# Patient Record
Sex: FEMALE | Race: WHITE | NOT HISPANIC OR LATINO | Employment: OTHER | URBAN - METROPOLITAN AREA
[De-identification: names, ages, dates, MRNs, and addresses within clinical notes are randomized per-mention and may not be internally consistent; named-entity substitution may affect disease eponyms.]

---

## 2018-03-16 ENCOUNTER — OFFICE VISIT (OUTPATIENT)
Dept: URGENT CARE | Facility: CLINIC | Age: 78
End: 2018-03-16
Payer: MEDICARE

## 2018-03-16 VITALS
HEIGHT: 63 IN | HEART RATE: 70 BPM | BODY MASS INDEX: 27.29 KG/M2 | WEIGHT: 154 LBS | RESPIRATION RATE: 18 BRPM | SYSTOLIC BLOOD PRESSURE: 128 MMHG | OXYGEN SATURATION: 96 % | DIASTOLIC BLOOD PRESSURE: 76 MMHG | TEMPERATURE: 98.3 F

## 2018-03-16 DIAGNOSIS — L23.5 ALLERGIC DERMATITIS DUE TO OTHER CHEMICAL PRODUCT: Primary | ICD-10-CM

## 2018-03-16 PROCEDURE — 99213 OFFICE O/P EST LOW 20 MIN: CPT | Performed by: NURSE PRACTITIONER

## 2018-03-16 PROCEDURE — G0463 HOSPITAL OUTPT CLINIC VISIT: HCPCS | Performed by: NURSE PRACTITIONER

## 2018-03-16 RX ORDER — APIXABAN 5 MG/1
TABLET, FILM COATED ORAL
COMMUNITY
Start: 2018-03-06

## 2018-03-16 RX ORDER — LEVOTHYROXINE SODIUM 0.05 MG/1
TABLET ORAL
COMMUNITY
Start: 2018-01-10

## 2018-03-16 RX ORDER — ATENOLOL 25 MG/1
TABLET ORAL
COMMUNITY
Start: 2018-03-12

## 2018-03-16 RX ORDER — FLUTICASONE PROPIONATE 50 MCG
SPRAY, SUSPENSION (ML) NASAL
COMMUNITY
Start: 2017-12-14

## 2018-03-16 RX ORDER — BRIMONIDINE TARTRATE/TIMOLOL 0.2%-0.5%
DROPS OPHTHALMIC (EYE)
COMMUNITY
Start: 2018-02-12

## 2018-03-16 RX ORDER — SIMVASTATIN 10 MG
TABLET ORAL
COMMUNITY
Start: 2018-01-10

## 2018-03-16 RX ORDER — LEVOCETIRIZINE DIHYDROCHLORIDE 5 MG/1
TABLET, FILM COATED ORAL
COMMUNITY
Start: 2018-03-08

## 2018-03-16 RX ORDER — OMEPRAZOLE 40 MG/1
CAPSULE, DELAYED RELEASE ORAL
COMMUNITY
Start: 2018-03-06

## 2018-03-16 RX ORDER — TRIAMCINOLONE ACETONIDE 1 MG/G
CREAM TOPICAL 2 TIMES DAILY
Qty: 30 G | Refills: 0 | Status: SHIPPED | OUTPATIENT
Start: 2018-03-16

## 2018-03-16 RX ORDER — METHYLPREDNISOLONE 4 MG/1
TABLET ORAL
Qty: 21 TABLET | Refills: 0 | Status: SHIPPED | OUTPATIENT
Start: 2018-03-16 | End: 2021-06-22 | Stop reason: ALTCHOICE

## 2018-03-16 NOTE — PROGRESS NOTES
[facility]        NAME: Nadiya Ortega is a 69 y o  female  : 1940    MRN: [id number]  DATE: 2018  TIME: 11:06 AM    Assessment and Plan   Allergic dermatitis due to other chemical product [L23 5]  1  Allergic dermatitis due to other chemical product  Methylprednisolone 4 MG TBPK    triamcinolone (KENALOG) 0 1 % cream         Patient Instructions       Follow up with PCP in 3-5 days  Proceed to  ER if symptoms worsen  You appear to be having an allergic reaction to the facial cream you have been using  Stop using the cream     You have been prescribed a facial and oral steroid - take as prescribed  See a dermatologist if this does not help with symptoms  Cool compresses for swelling and pain  Chief Complaint     Chief Complaint   Patient presents with    Rash     Three days ago she developed red, swollen itchy patches on her cheeks and below her eyes  History of Present Illness       This is a 69year old female who states started using a new facial cream from [place] and then 1 week later developed bilateral eye swelling, facial warmth and redness from the cream  She denies any ACE inhibitor use, new perfumes, soaps, detergents  Rash   This is a new problem  The current episode started in the past 7 days  Review of Systems   Review of Systems   Constitutional: Negative  HENT: Positive for facial swelling  Eyes: Negative  Respiratory: Negative  Cardiovascular: Negative  Gastrointestinal: Negative  Endocrine: Negative  Genitourinary: Negative  Musculoskeletal: Negative  Skin: Positive for rash  Allergic/Immunologic: Negative  Neurological: Negative  Hematological: Negative  Psychiatric/Behavioral: Negative            Current Medications       Current Outpatient Prescriptions:     atenolol (TENORMIN) 25 mg tablet, , Disp: , Rfl:     COMBIGAN 0 2-0 5 %, , Disp: , Rfl:     ELIQUIS 5 MG, , Disp: , Rfl:    fluticasone (FLONASE) 50 mcg/act nasal spray, , Disp: , Rfl:     levocetirizine (XYZAL) 5 MG tablet, , Disp: , Rfl:     levothyroxine 50 mcg tablet, , Disp: , Rfl:     Multiple Vitamins-Minerals (MULTIVITAMIN ADULT PO), Take by mouth, Disp: , Rfl:     omeprazole (PriLOSEC) 40 MG capsule, , Disp: , Rfl:     simvastatin (ZOCOR) 10 mg tablet, , Disp: , Rfl:     Methylprednisolone 4 MG TBPK, Use as directed on package, Disp: 21 tablet, Rfl: 0    triamcinolone (KENALOG) 0 1 % cream, Apply topically 2 (two) times a day Apply a very small thin layer  Do not get in the eyes  , Disp: 30 g, Rfl: 0    Current Allergies     Allergies as of 03/16/2018    (No Known Allergies)            The following portions of the patient's history were reviewed and updated as appropriate: allergies, current medications, past family history, past medical history, past social history, past surgical history and problem list      No past medical history on file  No past surgical history on file  No family history on file  Medications have been verified  Objective   /76   Pulse 70   Temp 98 3 °F (36 8 °C)   Resp 18   Ht 5' 3" (1 6 m)   Wt 69 9 kg (154 lb)   SpO2 96%   BMI 27 28 kg/m²        Physical Exam     Physical Exam   Constitutional: She is oriented to person, place, and time  She appears well-developed and well-nourished  No distress  HENT:   B/L upper and lower eye lids are swollen, red and warm to touch  This extends into the right cheek  Left cheek is also warm, red and swollen with extension to mandible area  Or oral airway edema      Eyes: EOM are normal  Pupils are equal, round, and reactive to light  Neck: Normal range of motion  Neck supple  Cardiovascular: Normal rate, regular rhythm and normal heart sounds  Pulmonary/Chest: Effort normal and breath sounds normal    Abdominal: Soft  Bowel sounds are normal    Musculoskeletal: Normal range of motion     Neurological: She is alert and oriented to person, place, and time  She has normal reflexes  Skin: Skin is warm and dry  She is not diaphoretic  Psychiatric: She has a normal mood and affect  Her behavior is normal  Judgment and thought content normal    Nursing note and vitals reviewed

## 2018-03-16 NOTE — PATIENT INSTRUCTIONS
Follow up with PCP in 3-5 days  Proceed to  ER if symptoms worsen  You appear to be having an allergic reaction to the facial cream you have been using  Stop using the cream     You have been prescribed a facial and oral steroid - take as prescribed  See a dermatologist if this does not help with symptoms  Cool compresses for swelling and pain  Contact Dermatitis   WHAT YOU NEED TO KNOW:   Contact dermatitis is a skin rash  It develops when you touch something that irritates your skin or causes an allergic reaction  DISCHARGE INSTRUCTIONS:   Call 911 for any of the following:   · You have sudden trouble breathing  · Your throat swells and you have trouble eating  · Your face is swollen  Contact your healthcare provider if:   · You have a fever  · Your blisters are draining pus  · Your rash spreads or does not get better, even after treatment  · You have questions or concerns about your condition or care  Medicines:   · Medicines  help decrease itching and swelling  They will be given as a topical medicine to apply to your rash or as a pill  · Take your medicine as directed  Contact your healthcare provider if you think your medicine is not helping or if you have side effects  Tell him or her if you are allergic to any medicine  Keep a list of the medicines, vitamins, and herbs you take  Include the amounts, and when and why you take them  Bring the list or the pill bottles to follow-up visits  Carry your medicine list with you in case of an emergency  Manage contact dermatitis:   · Take short baths or showers in cool water  Use mild soap or soap-free cleansers  Add oatmeal, baking soda, or cornstarch to the bath water to help decrease skin irritation  · Avoid skin irritants , such as makeup, hair products, soaps, and cleansers  Use products that do not contain perfume or dye  · Apply a cool compress to your rash  This will help soothe your skin       · Keep your skin moist   Rub unscented cream or lotion on your skin to prevent dryness and itching  Do this right after a bath or shower when your skin is still damp  Follow up with your healthcare provider or dermatologist in 2 to 3 days:  Write down your questions so you remember to ask them during your visits  © 2017 [employer] Information is for End User's use only and may not be sold, redistributed or otherwise used for commercial purposes  All illustrations and images included in CareNotes® are the copyrighted property of [employer]  or Sergio Owen  The above information is an  only  It is not intended as medical advice for individual conditions or treatments  Talk to your doctor, nurse or pharmacist before following any medical regimen to see if it is safe and effective for you

## 2019-12-14 ENCOUNTER — OFFICE VISIT (OUTPATIENT)
Dept: URGENT CARE | Facility: CLINIC | Age: 79
End: 2019-12-14
Payer: MEDICARE

## 2019-12-14 VITALS
RESPIRATION RATE: 16 BRPM | BODY MASS INDEX: 27.46 KG/M2 | TEMPERATURE: 99.1 F | HEART RATE: 70 BPM | DIASTOLIC BLOOD PRESSURE: 62 MMHG | SYSTOLIC BLOOD PRESSURE: 118 MMHG | WEIGHT: 155 LBS | OXYGEN SATURATION: 94 % | HEIGHT: 63 IN

## 2019-12-14 DIAGNOSIS — J06.9 UPPER RESPIRATORY TRACT INFECTION, UNSPECIFIED TYPE: Primary | ICD-10-CM

## 2019-12-14 DIAGNOSIS — H10.022 PINK EYE DISEASE OF LEFT EYE: ICD-10-CM

## 2019-12-14 PROCEDURE — 99213 OFFICE O/P EST LOW 20 MIN: CPT | Performed by: PREVENTIVE MEDICINE

## 2019-12-14 PROCEDURE — G0463 HOSPITAL OUTPT CLINIC VISIT: HCPCS | Performed by: PREVENTIVE MEDICINE

## 2019-12-14 RX ORDER — GENTAMICIN SULFATE 3 MG/ML
1 SOLUTION/ DROPS OPHTHALMIC EVERY 4 HOURS
Qty: 5 ML | Refills: 0 | Status: SHIPPED | OUTPATIENT
Start: 2019-12-14

## 2019-12-14 NOTE — PATIENT INSTRUCTIONS
Use the eyedrops as directed  If the redness is not gone in the eye I Monday you must have a recheck  Drink at least 6 or 8 glasses of water or juice a day  Using a vaporizer or humidifier in the bedroom will be very helpful  Motrin or Aleve or aspirin for fever chills or aches  Robitussin DM or NyQuil for cough  Afrin nasal spray for facial and head congestion  Inhaling steam coming up from the sink will be very helpful  If symptoms are getting worse over the next 4-7 days you must be rechecked

## 2019-12-14 NOTE — PROGRESS NOTES
[facility]        NAME: Silvia Barnett is a 67 y o  female  : 1940    MRN: [id number]  DATE: 2019  TIME: 11:10 AM    Assessment and Plan   Upper respiratory tract infection, unspecified type [J06 9]  1  Upper respiratory tract infection, unspecified type     2  Pink eye disease of left eye  gentamicin (GARAMYCIN) 0 3 % ophthalmic solution         Patient Instructions       Follow up with PCP in 3-5 days  Proceed to  ER if symptoms worsen  Chief Complaint     Chief Complaint   Patient presents with    Cold Like Symptoms     Sinus congestion, pressure and eye reddness since Friday    Eye Pain         History of Present Illness       Cough congestion and eye redness beginning yesterday  Review of Systems   Review of Systems   HENT: Positive for congestion  Eyes: Positive for discharge and redness  Respiratory: Positive for cough  Current Medications       Current Outpatient Medications:     atenolol (TENORMIN) 25 mg tablet, , Disp: , Rfl:     COMBIGAN 0 2-0 5 %, , Disp: , Rfl:     ELIQUIS 5 MG, , Disp: , Rfl:     levothyroxine 50 mcg tablet, , Disp: , Rfl:     Multiple Vitamins-Minerals (MULTIVITAMIN ADULT PO), Take by mouth, Disp: , Rfl:     simvastatin (ZOCOR) 10 mg tablet, , Disp: , Rfl:     fluticasone (FLONASE) 50 mcg/act nasal spray, , Disp: , Rfl:     gentamicin (GARAMYCIN) 0 3 % ophthalmic solution, Administer 1 drop into the left eye every 4 (four) hours, Disp: 5 mL, Rfl: 0    levocetirizine (XYZAL) 5 MG tablet, , Disp: , Rfl:     Methylprednisolone 4 MG TBPK, Use as directed on package (Patient not taking: Reported on 2019), Disp: 21 tablet, Rfl: 0    omeprazole (PriLOSEC) 40 MG capsule, , Disp: , Rfl:     triamcinolone (KENALOG) 0 1 % cream, Apply topically 2 (two) times a day Apply a very small thin layer  Do not get in the eyes   (Patient not taking: Reported on 2019), Disp: 30 g, Rfl: 0    Current Allergies     Allergies as of 12/14/2019    (No Known Allergies)            The following portions of the patient's history were reviewed and updated as appropriate: allergies, current medications, past family history, past medical history, past social history, past surgical history and problem list      No past medical history on file  No past surgical history on file  No family history on file  Medications have been verified  Objective   /62   Pulse 70   Temp 99 1 °F (37 3 °C)   Resp 16   Ht 5' 3" (1 6 m)   Wt 70 3 kg (155 lb)   SpO2 94%   BMI 27 46 kg/m²        Physical Exam     Physical Exam   HENT:   Right Ear: External ear normal    Left Ear: External ear normal    Mouth/Throat: Oropharynx is clear and moist    Eyes:   The left eye has a very markedly reddened and injected sclera with some discharge on the lids   Neck: Normal range of motion  Cardiovascular: Normal heart sounds  Pulmonary/Chest: Breath sounds normal    Lymphadenopathy:     She has no cervical adenopathy

## 2020-05-19 LAB — HBA1C MFR BLD HPLC: 5.6 %

## 2021-01-18 ENCOUNTER — TELEMEDICINE (OUTPATIENT)
Dept: GASTROENTEROLOGY | Facility: CLINIC | Age: 81
End: 2021-01-18
Payer: MEDICARE

## 2021-01-18 VITALS — BODY MASS INDEX: 26.58 KG/M2 | HEIGHT: 63 IN | WEIGHT: 150 LBS

## 2021-01-18 DIAGNOSIS — Z85.038 HISTORY OF COLON CANCER: Primary | ICD-10-CM

## 2021-01-18 DIAGNOSIS — R05.3 CHRONIC COUGH: ICD-10-CM

## 2021-01-18 DIAGNOSIS — Z79.01 CHRONIC ANTICOAGULATION: ICD-10-CM

## 2021-01-18 PROCEDURE — 99442 PR PHYS/QHP TELEPHONE EVALUATION 11-20 MIN: CPT | Performed by: INTERNAL MEDICINE

## 2021-01-18 NOTE — LETTER
January 18, 2021     [facility]  [facility]  [address]    Patient: Renee Velazco   YOB: 1940   Date of Visit: 1/18/2021       Dear Dr Sarita Flores: Thank you for referring Alpa Orourke to me for evaluation  Below are my notes for this consultation  If you have questions, please do not hesitate to call me  I look forward to following your patient along with you  Sincerely,        Lei Like Karyle Aland, MD        CC: Kit Ruse, MD Lei Like Karyle Aland, MD  1/18/2021  3:56 PM  Sign when Signing Visit    Virtual Brief Visit  It was my intent to perform this visit via video technology but the patient was not able to do a video connection so the visit was completed via audio telephone only  Assessment/Plan:  1  Personal history of colon cancer  S/P sigmoid resection 2011  Last colonoscopy December 2017  - colonoscopy at [facility]    2  Atrial fibrillation on chronic anticoagulation  Currently on Eliquis  Followed by Dr Jamarcus Vasquez  - will hold Eliquis for 2 days if okay with Cardiology  Patient understands the potential thromboembolic risks    3  Chronic cough  Thought possibly related to GERD but no improvement with omeprazole  ENT related? - Stop omeprazole  - T/C ENT evaluation  Problem List Items Addressed This Visit        Other    Chronic anticoagulation    History of colon cancer - Primary    Chronic cough                Reason for visit is   Chief Complaint   Patient presents with    Colonoscopy     On Eliquis    Virtual Brief Visit        Encounter provider Lei Like Karyle Aland, MD    Provider located at [address] [address] [phone]    Recent Visits  No visits were found meeting these conditions     Showing recent visits within past 7 days and meeting all other requirements     Today's Visits  Date Type Provider Dept 01/18/21 Telemedicine Arnold Severe, MD [facility]   Showing today's visits and meeting all other requirements     Future Appointments  No visits were found meeting these conditions  Showing future appointments within next 150 days and meeting all other requirements        After connecting through telephone, the patient was identified by name and date of birth  Luisana Oquendo was informed that this is a telemedicine visit and that the visit is being conducted through telephone  My office door was closed  No one else was in the room  She acknowledged consent and understanding of privacy and security of the platform  The patient has agreed to participate and understands she can discontinue the visit at any time  Patient is aware this is a billable service  Subjective    Luisana Oquendo is a 79 y o  female with a history of colon cancer who had a sigmoid resection 2011  Her last colonoscopy was 2017  She is due for a follow exam but she is on anticoagulation because of atrial fibrillation  Patient had office visit scheduled to manage anticoagulation  From a GI standpoint she is doing well  She is moving her bowels regularly  She denies any GI bleeding, abdominal pain, or weight loss  Her cardiac status is stable as well  She continues to have chronic cough  Omeprazole was started in 2017 for this chronic cough thinking it may be atypical GERD  Unfortunately despite taking this is made no difference in her symptoms  She denies any dysphagia, odynophagia, early satiety  She denies any heartburn or indigestion        HPI     Past Medical History:   Diagnosis Date    Atrial fibrillation ([facility] [address] )     Dr Jackeline Covington Chronic anticoagulation     History of colon cancer s/p sigmoid resection 2011    Hyperlipidemia     Hypertension        Past Surgical History:   Procedure Laterality Date    ATRIAL CARDIAC PACEMAKER INSERTION  2006 and 2014    COLON SIGMOID RESECTION  2011 Current Outpatient Medications   Medication Sig Dispense Refill    atenolol (TENORMIN) 25 mg tablet       COMBIGAN 0 2-0 5 %       ELIQUIS 5 MG       gentamicin (GARAMYCIN) 0 3 % ophthalmic solution Administer 1 drop into the left eye every 4 (four) hours 5 mL 0    levothyroxine 50 mcg tablet       Multiple Vitamins-Minerals (MULTIVITAMIN ADULT PO) Take by mouth      omeprazole (PriLOSEC) 40 MG capsule       simvastatin (ZOCOR) 10 mg tablet       fluticasone (FLONASE) 50 mcg/act nasal spray       levocetirizine (XYZAL) 5 MG tablet       Methylprednisolone 4 MG TBPK Use as directed on package (Patient not taking: Reported on 1/18/2021) 21 tablet 0    triamcinolone (KENALOG) 0 1 % cream Apply topically 2 (two) times a day Apply a very small thin layer  Do not get in the eyes  (Patient not taking: Reported on 1/18/2021) 30 g 0     No current facility-administered medications for this visit  Social History     Tobacco Use    Smoking status: Never Smoker    Smokeless tobacco: Never Used   Substance Use Topics    Alcohol use: Yes     Frequency: Monthly or less    Drug use: Not on file     Family History   Problem Relation Age of Onset    Colon cancer Brother        No Known Allergies    Review of Systems per HPI otherwise negative    Vitals:    01/18/21 0913   Weight: 68 kg (150 lb)   Height: 5' 3" (1 6 m)         I spent 20 minutes directly with the patient during this visit    Trice Wallace Byron acknowledges that she has consented to an online visit or consultation  She understands that the online visit is based solely on information provided by her, and that, in the absence of a face-to-face physical evaluation by the physician, the diagnosis she receives is both limited and provisional in terms of accuracy and completeness  This is not intended to replace a full medical face-to-face evaluation by the physician   Pro Lange understands and accepts these terms

## 2021-01-18 NOTE — H&P (VIEW-ONLY)
Virtual Brief Visit  It was my intent to perform this visit via video technology but the patient was not able to do a video connection so the visit was completed via audio telephone only  Assessment/Plan:  1  Personal history of colon cancer  S/P sigmoid resection 2011  Last colonoscopy December 2017  - colonoscopy at [facility]    2  Atrial fibrillation on chronic anticoagulation  Currently on Eliquis  Followed by Dr Carl Tafoya  - will hold Eliquis for 2 days if okay with Cardiology  Patient understands the potential thromboembolic risks    3  Chronic cough  Thought possibly related to GERD but no improvement with omeprazole  ENT related? - Stop omeprazole  - T/C ENT evaluation  Problem List Items Addressed This Visit        Other    Chronic anticoagulation    History of colon cancer - Primary    Chronic cough                Reason for visit is   Chief Complaint   Patient presents with    Colonoscopy     On Eliquis    Virtual Brief Visit        Encounter provider Jalen Rhoades MD    Provider located at [address] [address] [phone]    Recent Visits  No visits were found meeting these conditions  Showing recent visits within past 7 days and meeting all other requirements     Today's Visits  Date Type Provider Dept   01/18/21 Telemedicine Maria E Linton MD [facility]   Showing today's visits and meeting all other requirements     Future Appointments  No visits were found meeting these conditions  Showing future appointments within next 150 days and meeting all other requirements        After connecting through telephone, the patient was identified by name and date of birth  Vy Marie was informed that this is a telemedicine visit and that the visit is being conducted through telephone  My office door was closed  No one else was in the room    She acknowledged consent and understanding of privacy and security of the platform  The patient has agreed to participate and understands she can discontinue the visit at any time  Patient is aware this is a billable service  Subjective    Lesa Palma is a 79 y o  female with a history of colon cancer who had a sigmoid resection 2011  Her last colonoscopy was 2017  She is due for a follow exam but she is on anticoagulation because of atrial fibrillation  Patient had office visit scheduled to manage anticoagulation  From a GI standpoint she is doing well  She is moving her bowels regularly  She denies any GI bleeding, abdominal pain, or weight loss  Her cardiac status is stable as well  She continues to have chronic cough  Omeprazole was started in 2017 for this chronic cough thinking it may be atypical GERD  Unfortunately despite taking this is made no difference in her symptoms  She denies any dysphagia, odynophagia, early satiety  She denies any heartburn or indigestion        HPI     Past Medical History:   Diagnosis Date    Atrial fibrillation ([facility] [address] )     Dr Gamble Stage Chronic anticoagulation     History of colon cancer s/p sigmoid resection 2011    Hyperlipidemia     Hypertension        Past Surgical History:   Procedure Laterality Date    ATRIAL CARDIAC PACEMAKER INSERTION  2006 and 2014    COLON SIGMOID RESECTION  2011       Current Outpatient Medications   Medication Sig Dispense Refill    atenolol (TENORMIN) 25 mg tablet       COMBIGAN 0 2-0 5 %       ELIQUIS 5 MG       gentamicin (GARAMYCIN) 0 3 % ophthalmic solution Administer 1 drop into the left eye every 4 (four) hours 5 mL 0    levothyroxine 50 mcg tablet       Multiple Vitamins-Minerals (MULTIVITAMIN ADULT PO) Take by mouth      omeprazole (PriLOSEC) 40 MG capsule       simvastatin (ZOCOR) 10 mg tablet       fluticasone (FLONASE) 50 mcg/act nasal spray       levocetirizine (XYZAL) 5 MG tablet       Methylprednisolone 4 MG TBPK Use as directed on package (Patient not taking: Reported on 1/18/2021) 21 tablet 0    triamcinolone (KENALOG) 0 1 % cream Apply topically 2 (two) times a day Apply a very small thin layer  Do not get in the eyes  (Patient not taking: Reported on 1/18/2021) 30 g 0     No current facility-administered medications for this visit  Social History     Tobacco Use    Smoking status: Never Smoker    Smokeless tobacco: Never Used   Substance Use Topics    Alcohol use: Yes     Frequency: Monthly or less    Drug use: Not on file     Family History   Problem Relation Age of Onset    Colon cancer Brother        No Known Allergies    Review of Systems per HPI otherwise negative    Vitals:    01/18/21 0913   Weight: 68 kg (150 lb)   Height: 5' 3" (1 6 m)         I spent 20 minutes directly with the patient during this visit    Trice Walalce Byron acknowledges that she has consented to an online visit or consultation  She understands that the online visit is based solely on information provided by her, and that, in the absence of a face-to-face physical evaluation by the physician, the diagnosis she receives is both limited and provisional in terms of accuracy and completeness  This is not intended to replace a full medical face-to-face evaluation by the physician  Evert Hogan understands and accepts these terms

## 2021-01-18 NOTE — PROGRESS NOTES
Virtual Brief Visit  It was my intent to perform this visit via video technology but the patient was not able to do a video connection so the visit was completed via audio telephone only  Assessment/Plan:  1  Personal history of colon cancer  S/P sigmoid resection 2011  Last colonoscopy December 2017  - colonoscopy at [facility]    2  Atrial fibrillation on chronic anticoagulation  Currently on Eliquis  Followed by Dr Ana Cassidy  - will hold Eliquis for 2 days if okay with Cardiology  Patient understands the potential thromboembolic risks    3  Chronic cough  Thought possibly related to GERD but no improvement with omeprazole  ENT related? - Stop omeprazole  - T/C ENT evaluation  Problem List Items Addressed This Visit        Other    Chronic anticoagulation    History of colon cancer - Primary    Chronic cough                Reason for visit is   Chief Complaint   Patient presents with    Colonoscopy     On Eliquis    Virtual Brief Visit        Encounter provider Greyson Blankenship MD    Provider located at [address] [address] [phone]    Recent Visits  No visits were found meeting these conditions  Showing recent visits within past 7 days and meeting all other requirements     Today's Visits  Date Type Provider Dept   01/18/21 Telemedicine Chelo Estes MD [facility]   Showing today's visits and meeting all other requirements     Future Appointments  No visits were found meeting these conditions  Showing future appointments within next 150 days and meeting all other requirements        After connecting through telephone, the patient was identified by name and date of birth  Christina Patel was informed that this is a telemedicine visit and that the visit is being conducted through telephone  My office door was closed  No one else was in the room    She acknowledged consent and understanding of privacy and security of the platform  The patient has agreed to participate and understands she can discontinue the visit at any time  Patient is aware this is a billable service  Subjective    [place] Doctor is a 79 y o  female with a history of colon cancer who had a sigmoid resection 2011  Her last colonoscopy was 2017  She is due for a follow exam but she is on anticoagulation because of atrial fibrillation  Patient had office visit scheduled to manage anticoagulation  From a GI standpoint she is doing well  She is moving her bowels regularly  She denies any GI bleeding, abdominal pain, or weight loss  Her cardiac status is stable as well  She continues to have chronic cough  Omeprazole was started in 2017 for this chronic cough thinking it may be atypical GERD  Unfortunately despite taking this is made no difference in her symptoms  She denies any dysphagia, odynophagia, early satiety  She denies any heartburn or indigestion        HPI     Past Medical History:   Diagnosis Date    Atrial fibrillation ([facility] [address] )     Dr Nieves Bronson Chronic anticoagulation     History of colon cancer s/p sigmoid resection 2011    Hyperlipidemia     Hypertension        Past Surgical History:   Procedure Laterality Date    ATRIAL CARDIAC PACEMAKER INSERTION  2006 and 2014    COLON SIGMOID RESECTION  2011       Current Outpatient Medications   Medication Sig Dispense Refill    atenolol (TENORMIN) 25 mg tablet       COMBIGAN 0 2-0 5 %       ELIQUIS 5 MG       gentamicin (GARAMYCIN) 0 3 % ophthalmic solution Administer 1 drop into the left eye every 4 (four) hours 5 mL 0    levothyroxine 50 mcg tablet       Multiple Vitamins-Minerals (MULTIVITAMIN ADULT PO) Take by mouth      omeprazole (PriLOSEC) 40 MG capsule       simvastatin (ZOCOR) 10 mg tablet       fluticasone (FLONASE) 50 mcg/act nasal spray       levocetirizine (XYZAL) 5 MG tablet       Methylprednisolone 4 MG TBPK Use as directed on package (Patient not taking: Reported on 1/18/2021) 21 tablet 0    triamcinolone (KENALOG) 0 1 % cream Apply topically 2 (two) times a day Apply a very small thin layer  Do not get in the eyes  (Patient not taking: Reported on 1/18/2021) 30 g 0     No current facility-administered medications for this visit  Social History     Tobacco Use    Smoking status: Never Smoker    Smokeless tobacco: Never Used   Substance Use Topics    Alcohol use: Yes     Frequency: Monthly or less    Drug use: Not on file     Family History   Problem Relation Age of Onset    Colon cancer Brother        No Known Allergies    Review of Systems per HPI otherwise negative    Vitals:    01/18/21 0913   Weight: 68 kg (150 lb)   Height: 5' 3" (1 6 m)         I spent 20 minutes directly with the patient during this visit    Trice Wallcae Byron acknowledges that she has consented to an online visit or consultation  She understands that the online visit is based solely on information provided by her, and that, in the absence of a face-to-face physical evaluation by the physician, the diagnosis she receives is both limited and provisional in terms of accuracy and completeness  This is not intended to replace a full medical face-to-face evaluation by the physician  Graciela Marte understands and accepts these terms

## 2021-01-19 ENCOUNTER — TELEPHONE (OUTPATIENT)
Dept: GASTROENTEROLOGY | Facility: CLINIC | Age: 81
End: 2021-01-19

## 2021-01-28 ENCOUNTER — TELEMEDICINE (OUTPATIENT)
Dept: GASTROENTEROLOGY | Facility: CLINIC | Age: 81
End: 2021-01-28

## 2021-01-28 VITALS — HEIGHT: 63 IN | BODY MASS INDEX: 26.58 KG/M2 | WEIGHT: 150 LBS

## 2021-01-28 DIAGNOSIS — Z85.038 HISTORY OF COLON CANCER: Primary | ICD-10-CM

## 2021-01-28 NOTE — PROGRESS NOTES
Why does your doctor want you to have this procedure? phx colon ca    Do you have kidney disease?  no  If yes, are you on dialysis :     Have you had diverticulitis within the past 2 months? no    Are you diabetic?  no  If yes, insulin dependent: If yes, provide diabetic instructions sheet     Do take iron supplements?  no  If yes, instruct patient to hold iron supplement for 7 days prior    Are you on a blood thinner? yes   Was the blood thinner sheet complete and faxed to cardiologist yes  Plavix (clopidogrel), Coumadin (warfarin), Lovenox (enoxaparin), Xarelto (rivaroxaban), Pradaxa(dabigatran), Eliquis(apixaban) Savaysa/Lixiana (edoxapan)    Do you have an automatic implantable cardiac defibrillator (AICD)/pacemaker ([facility])? , Pacemaker only  Was AICD/pacemaker sheet completed and faxed to cardiologist? no    Are you on home oxygen? no  If yes, continuous or nocturnal:     Have you been treated for MRSA, VRE or any communicable diseases? no    Heart attack, stroke, or stent within 3 months? no  Schedule at Hospital if within 3-6 months   Use nitroglycerin for chest pain in the last 6 months? no    History of organ  transplant?  no   If yes, notify Endo      History of neck/throat/tongue surgery or cancer? no  IF yes, notify Endo      Any problems with anesthesia in the past? no     Was stool C diff ordered?  no Stool specimen needs to be completed prior to procedure    Do have any facial or body piercings?no     Do you have a latex allergy? no     Do have an allergy to metals? ([facility] study only) no     If pediatric patient, was consent faxed to pediatrician no     Patient rights reviewed yes     Colon phone prep completed; suprep instructions reviewed and emailed to pt; rx forwarded to provider for approval; pt will hold eliquis 2 days prior; last dose 2/1

## 2021-02-04 ENCOUNTER — ANESTHESIA EVENT (OUTPATIENT)
Dept: GASTROENTEROLOGY | Facility: AMBULATORY SURGERY CENTER | Age: 81
End: 2021-02-04

## 2021-02-04 ENCOUNTER — ANESTHESIA (OUTPATIENT)
Dept: GASTROENTEROLOGY | Facility: AMBULATORY SURGERY CENTER | Age: 81
End: 2021-02-04

## 2021-02-04 ENCOUNTER — HOSPITAL ENCOUNTER (OUTPATIENT)
Dept: GASTROENTEROLOGY | Facility: AMBULATORY SURGERY CENTER | Age: 81
Discharge: HOME | End: 2021-02-04
Payer: MEDICARE

## 2021-02-04 VITALS — HEART RATE: 69 BPM

## 2021-02-04 VITALS
OXYGEN SATURATION: 99 % | HEART RATE: 69 BPM | RESPIRATION RATE: 33 BRPM | TEMPERATURE: 98.4 F | DIASTOLIC BLOOD PRESSURE: 54 MMHG | SYSTOLIC BLOOD PRESSURE: 105 MMHG

## 2021-02-04 DIAGNOSIS — Z85.038 HISTORY OF COLON CANCER: ICD-10-CM

## 2021-02-04 PROCEDURE — 88305 TISSUE EXAM BY PATHOLOGIST: CPT | Performed by: PATHOLOGY

## 2021-02-04 PROCEDURE — 45380 COLONOSCOPY AND BIOPSY: CPT | Performed by: INTERNAL MEDICINE

## 2021-02-04 RX ORDER — PROPOFOL 10 MG/ML
INJECTION, EMULSION INTRAVENOUS AS NEEDED
Status: DISCONTINUED | OUTPATIENT
Start: 2021-02-04 | End: 2021-02-04

## 2021-02-04 RX ORDER — SODIUM CHLORIDE 9 MG/ML
50 INJECTION, SOLUTION INTRAVENOUS CONTINUOUS
Status: DISCONTINUED | OUTPATIENT
Start: 2021-02-04 | End: 2021-02-08 | Stop reason: HOSPADM

## 2021-02-04 RX ADMIN — SODIUM CHLORIDE: 9 INJECTION, SOLUTION INTRAVENOUS at 08:24

## 2021-02-04 RX ADMIN — PROPOFOL 100 MG: 10 INJECTION, EMULSION INTRAVENOUS at 08:30

## 2021-02-04 RX ADMIN — PROPOFOL 100 MG: 10 INJECTION, EMULSION INTRAVENOUS at 08:42

## 2021-02-04 NOTE — INTERVAL H&P NOTE
H&P reviewed  After examining the patient I find no changes in the patients condition since the H&P had been written      Vitals:    02/04/21 0806   BP: 111/70   Pulse: 72   Resp: 12   Temp: 98 4 °F (36 9 °C)   SpO2: 96%

## 2021-02-04 NOTE — DISCHARGE INSTRUCTIONS
Hemorrhoids   WHAT YOU NEED TO KNOW:   What are hemorrhoids? Hemorrhoids are swollen blood vessels inside your rectum (internal hemorrhoids) or on your anus (external hemorrhoids)  Sometimes a hemorrhoid may prolapse  This means it extends out of your anus  What increases my risk for hemorrhoids? · Pregnancy or obesity    · Straining or sitting for a long time during bowel movements    · Liver disease    · Weak muscles around the anus caused by older age, rectal surgery, or anal intercourse    · A lack of physical activity    · Chronic diarrhea or constipation    · A low-fiber diet    What are the signs and symptoms of hemorrhoids? · Pain or itching around your anus or inside your rectum    · Swelling or bumps around your anus    · Bright red blood in your bowel movement, on the toilet paper, or in the toilet bowl    · Tissue bulging out of your anus (prolapsed hemorrhoids)    · Incontinence (poor control over urine or bowel movements)    How are hemorrhoids diagnosed? Your healthcare provider will ask about your symptoms, the foods you eat, and your bowel movements  He or she will examine your anus for external hemorrhoids  You may need the following:  · A digital rectal exam  is a test to check for hemorrhoids  Your healthcare provider will put a gloved finger inside your anus to feel for the hemorrhoids  · An anoscopy  is a test that uses a scope (small tube with a light and camera on the end) to look at your hemorrhoids  How are hemorrhoids treated? Treatment will depend on your symptoms  You may need any of the following:  · Medicines  can help decrease pain and swelling, and soften your bowel movement  The medicine may be a pill, pad, cream, or ointment  · Procedures  may be used to shrink or remove your hemorrhoid  Examples include rubber-band ligation, sclerotherapy, and photocoagulation  These procedures may be done in your healthcare provider's office   Ask your healthcare provider for more information about these procedures  · Surgery  may be needed to shrink or remove your hemorrhoids  How can I manage my symptoms? · Apply ice on your anus for 15 to 20 minutes every hour or as directed  Use an ice pack, or put crushed ice in a plastic bag  Cover it with a towel before you apply it to your anus  Ice helps prevent tissue damage and decreases swelling and pain  · Take a sitz bath  Fill a bathtub with 4 to 6 inches of warm water  You may also use a sitz bath pan that fits inside a toilet bowl  Sit in the sitz bath for 15 minutes  Do this 3 times a day, and after each bowel movement  The warm water can help decrease pain and swelling  · Keep your anal area clean  Gently wash the area with warm water daily  Soap may irritate the area  After a bowel movement, wipe with moist towelettes or wet toilet paper  Dry toilet paper can irritate the area  How can I help prevent hemorrhoids? · Do not strain to have a bowel movement  Do not sit on the toilet too long  These actions can increase pressure on the tissues in your rectum and anus  · Drink plenty of liquids  Liquids can help prevent constipation  Ask how much liquid to drink each day and which liquids are best for you  · Eat a variety of high-fiber foods  Examples include fruits, vegetables, and whole grains  Ask your healthcare provider how much fiber you need each day  You may need to take a fiber supplement  · Exercise as directed  Exercise, such as walking, may make it easier to have a bowel movement  Ask your healthcare provider to help you create an exercise plan  · Do not have anal sex  Anal sex can weaken the skin around your rectum and anus  · Avoid heavy lifting  This can cause straining and increase your risk for another hemorrhoid  When should I seek immediate care? · You have severe pain in your rectum or around your anus      · You have severe pain in your abdomen and you are vomiting  · You have bleeding from your anus that soaks through your underwear  When should I contact my healthcare provider? · You have frequent and painful bowel movements  · Your hemorrhoid looks or feels more swollen than usual      · You do not have a bowel movement for 2 days or more  · You see or feel tissue coming through your anus  · You have questions or concerns about your condition or care  CARE AGREEMENT:   You have the right to help plan your care  Learn about your health condition and how it may be treated  Discuss treatment options with your healthcare providers to decide what care you want to receive  You always have the right to refuse treatment  The above information is an  only  It is not intended as medical advice for individual conditions or treatments  Talk to your doctor, nurse or pharmacist before following any medical regimen to see if it is safe and effective for you  © Copyright [address] Information is for End User's use only and may not be sold, redistributed or otherwise used for commercial purposes  All illustrations and images included in CareNotes® are the copyrighted property of A D A M , Inc  or [address] [place] [address]  Colorectal Polyps   WHAT YOU NEED TO KNOW:   What are colorectal polyps? Colorectal polyps are small growths of tissue in the lining of the colon and rectum  Most polyps are hyperplastic polyps and are usually benign (noncancerous)  Certain types of polyps, called adenomatous polyps, may turn into cancer  What increases my risk of colorectal polyps? The exact cause of colorectal polyps is unknown   The following may increase your risk:  · Older age    · A diet of foods high in fat and low in fiber     · Family history of polyps    · Intestinal diseases, such as Crohn's disease or ulcerative colitis    · An unhealthy lifestyle, such as physical inactivity, smoking, or drinking alcohol    · Obesity    What are the signs and symptoms of colorectal polyps? · Blood in your bowel movement or bleeding from the rectum    · Change in bowel movement habits, such as diarrhea and constipation    · Abdominal pain    How are colorectal polyps diagnosed? You should have fecal blood screening once a year for colorectal disease if you are over 48years old  You should be screened earlier if you have an intestinal disease or a family history of polyps or colorectal cancer  During this screening, a sample of your bowel movement is checked for blood, which may be an early sign of colorectal polyps or cancer  You may also need any of the following tests:  · Digital rectal exam:  Your healthcare provider will examine your anus and use a finger to check your rectum for polyps  · Barium enema: A barium enema is an x-ray of the colon  A tube is put into your anus, and a liquid called barium is put through the tube  Barium is used so that healthcare providers can see your colon better on the x-ray film  · Virtual colonoscopy: This is a CT scan that takes pictures of the inside of your colon and rectum  A small, flexible tube is put into your rectum and air or carbon dioxide (gas) is used to expand your colon  This lets healthcare providers clearly see your colon and any polyps on a monitor  · Colonoscopy or sigmoidoscopy: These procedures help your healthcare provider see the inside of your colon using a flexible tube with a small light and camera on the end  During a sigmoidoscopy, your healthcare provider will only look at rectum and lower colon  During a colonoscopy, healthcare providers will look at the full length of your colon  Healthcare providers may remove a small amount of tissue from the colon for a biopsy  How are colorectal polyps treated? A polypectomy is a minimally invasive procedure to remove your polyps  They may be removed during a colonoscopy or sigmoidoscopy   Your healthcare provider may need to remove the polyps with a laparoscope  Laparoscopy is done by inserting a small, flexible scope into incisions made on your abdomen  What are the risks of colorectal polyps? You may bleed during a colonoscopy procedure  Your bowel may be perforated (torn) when polyps are removed  This may lead to an open abdominal surgery  During surgery, you may bleed too much or get an infection  Adenomatous polyps that are not removed may turn into cancer and become more difficult to treat  Where can I find support and more information? · [facility] ([facility])  [address] [place], [place] [address]  Phone: [phone]  Web Address: Emma Ruth  [facility] gov    When should I contact my healthcare provider? · You have a fever  · You have chills, a cough, or feel weak and achy  · You have abdominal pain that does not go away or gets worse after you take medicine  · Your abdomen is swollen  · You are losing weight without trying  · You have questions or concerns about your condition or care  When should I seek immediate care or call 1? · You have sudden shortness of breath  · You have a fast heart rate, fast breathing, or are too dizzy to stand up  · You have severe abdominal pain  · You see blood in your bowel movement  CARE AGREEMENT:   You have the right to help plan your care  Learn about your health condition and how it may be treated  Discuss treatment options with your healthcare providers to decide what care you want to receive  You always have the right to refuse treatment  The above information is an  only  It is not intended as medical advice for individual conditions or treatments  Talk to your doctor, nurse or pharmacist before following any medical regimen to see if it is safe and effective for you    © Copyright [address] [place] [address] Drive Information is for End User's use only and may not be sold, redistributed or otherwise used for commercial purposes  All illustrations and images included in CareNotes® are the copyrighted property of A D A M , Inc  or Fabrizio Orta  Diverticulosis   WHAT YOU NEED TO KNOW:   What is diverticulosis? Diverticulosis is a condition that causes small pockets called diverticula to form in your intestine  These pockets make it difficult for bowel movements to pass through your digestive system  What causes diverticulosis? Diverticula form when muscles have to work hard to move bowel movements through the intestine  The force causes bulges to form at weak areas in the intestine  This may happen if you eat foods that are low in fiber  Fiber helps give your bowel movements more bulk so they are larger and easier to move through your colon  The following may increase your risk of diverticulosis:  · A history of constipation    · Age 36 or older    · Obesity    · Lack of exercise    What are the signs and symptoms of diverticulosis? Diverticulosis usually does not cause any signs or symptoms  It may cause any of the following in some people:  · Pain or discomfort in your lower abdomen    · Abdominal bloating    · Constipation or diarrhea    How is diverticulosis diagnosed? Your healthcare provider will examine you and ask about your bowel movements, diet, and symptoms  He or she will also ask about any medical conditions you have or medicines you take  You may need any of the following:  · Blood tests  may be done to check for signs of inflammation  · A barium enema  is an x-ray of your colon that may show diverticula  A tube is put into your anus, and a liquid called barium is put through the tube  Barium is used so that healthcare providers can see your colon more clearly  · Flexible sigmoidoscopy  is a test to look for any changes in your lower intestines and rectum  It may also show the cause of any bleeding or pain  A soft, bendable tube with a light on the end will be put into your anus   It will then be moved forward into your intestine  · A colonoscopy  is used to look at your whole colon  A scope (long bendable tube with a light on the end) is used to take pictures  This test may show diverticula  · A CT scan , or CAT scan, may show diverticula  You may be given contrast liquid before the scan  Tell the healthcare provider if you have ever had an allergic reaction to contrast liquid  How is diverticulosis managed? The goal of treatment is to manage any symptoms you have and prevent other problems such as diverticulitis  Diverticulitis is swelling or infection of the diverticula  Your healthcare provider may recommend any of the following:  · Eat a variety of high-fiber foods  High-fiber foods help you have regular bowel movements  High-fiber foods include cooked beans, fruits, vegetables, and some cereals  Most adults need 25 to 35 grams of fiber each day  Your healthcare provider may recommend that you have more  Ask your healthcare provider how much fiber you need  Increase fiber slowly  You may have abdominal discomfort, bloating, and gas if you add fiber to your diet too quickly  You may need to take a fiber supplement if you are not getting enough fiber from food  · Medicines  to soften your bowel movements may be given  You may also need medicines to treat symptoms such as bloating and pain  · Drink liquids as directed  You may need to drink 2 to 3 liters (8 to 12 cups) of liquids every day  Ask your healthcare provider how much liquid to drink each day and which liquids are best for you  · Apply heat  on your abdomen for 20 to 30 minutes every 2 hours for as many days as directed  Heat helps decrease pain and muscle spasms  How can I help prevent diverticulitis or other symptoms? The following may help decrease your risk for diverticulitis or symptoms, such as bleeding   Talk to your provider about these or other things you can do to prevent problems that may occur with diverticulosis  · Exercise regularly  Ask your healthcare provider about the best exercise plan for you  Exercise can help you have regular bowel movements  Get 30 minutes of exercise on most days of the week  · Maintain a healthy weight  Ask your healthcare provider how much you should weigh  Ask him or her to help you create a weight loss plan if you are overweight  · Do not smoke  Nicotine and other chemicals in cigarettes increase your risk for diverticulitis  Ask your healthcare provider for information if you currently smoke and need help to quit  E-cigarettes or smokeless tobacco still contain nicotine  Talk to your healthcare provider before you use these products  · Ask your healthcare provider if it is safe to take NSAIDs  NSAIDs may increase your risk of diverticulitis  When should I seek immediate care? · You have severe pain on the left side of your lower abdomen  · Your bowel movements are bright or dark red  When should I contact my healthcare provider? · You have a fever and chills  · You feel dizzy or lightheaded  · You have nausea, or you are vomiting  · You have a change in your bowel movements  · You have questions or concerns about your condition or care  CARE AGREEMENT:   You have the right to help plan your care  Learn about your health condition and how it may be treated  Discuss treatment options with your healthcare providers to decide what care you want to receive  You always have the right to refuse treatment  The above information is an  only  It is not intended as medical advice for individual conditions or treatments  Talk to your doctor, nurse or pharmacist before following any medical regimen to see if it is safe and effective for you  © Copyright [address] Information is for End User's use only and may not be sold, redistributed or otherwise used for commercial purposes   All illustrations and images included in [facility] are the copyrighted property of A D A M , Inc  or Fabrizio Orta

## 2021-02-04 NOTE — ANESTHESIA POSTPROCEDURE EVALUATION
Post-Op Assessment Note    CV Status:  Stable  Pain Score: 0    Pain management: adequate     Mental Status:  Sleepy   Hydration Status:  Euvolemic   PONV Controlled:  Controlled   Airway Patency:  Patent       Post Op Vitals Reviewed: No      Staff: Anesthesiologist         No complications documented      BP 91/54 (02/04/21 0850)    Temp      Pulse 69 (02/04/21 0850)   Resp 18 (02/04/21 0850)    SpO2 99 % (02/04/21 0850)

## 2021-02-04 NOTE — ANESTHESIA PREPROCEDURE EVALUATION
Procedure:  COLONOSCOPY    Relevant Problems   ANESTHESIA (within normal limits)      CARDIO   (+) Atrial fibrillation (HCC)      ENDO (within normal limits)      GI/HEPATIC (within normal limits)      /RENAL (within normal limits)      GYN (within normal limits)      HEMATOLOGY (within normal limits)      MUSCULOSKELETAL (within normal limits)      NEURO/PSYCH   (+) History of colon cancer      PULMONARY (within normal limits)        Physical Exam    Airway    Mallampati score: II  TM Distance: >3 FB  Neck ROM: full     Dental       Cardiovascular  Cardiovascular exam normal    Pulmonary  Pulmonary exam normal     Other Findings        Anesthesia Plan  ASA Score- 3     Anesthesia Type- IV sedation with anesthesia with ASA Monitors  Additional Monitors:   Airway Plan:           Plan Factors-Exercise tolerance (METS): >4 METS  Chart reviewed  Induction- intravenous  Postoperative Plan-     Informed Consent- Anesthetic plan and risks discussed with patient

## 2021-02-09 DIAGNOSIS — Z23 ENCOUNTER FOR IMMUNIZATION: ICD-10-CM

## 2021-06-22 ENCOUNTER — CONSULT (OUTPATIENT)
Dept: PAIN MEDICINE | Facility: CLINIC | Age: 81
End: 2021-06-22
Payer: MEDICARE

## 2021-06-22 ENCOUNTER — APPOINTMENT (OUTPATIENT)
Dept: RADIOLOGY | Facility: CLINIC | Age: 81
End: 2021-06-22
Payer: MEDICARE

## 2021-06-22 ENCOUNTER — TELEPHONE (OUTPATIENT)
Dept: PAIN MEDICINE | Facility: CLINIC | Age: 81
End: 2021-06-22

## 2021-06-22 VITALS
DIASTOLIC BLOOD PRESSURE: 82 MMHG | WEIGHT: 153 LBS | HEIGHT: 63 IN | BODY MASS INDEX: 27.11 KG/M2 | TEMPERATURE: 97.3 F | HEART RATE: 80 BPM | SYSTOLIC BLOOD PRESSURE: 116 MMHG

## 2021-06-22 DIAGNOSIS — Z79.01 CHRONIC ANTICOAGULATION: ICD-10-CM

## 2021-06-22 DIAGNOSIS — M47.816 LUMBAR SPONDYLOSIS: ICD-10-CM

## 2021-06-22 DIAGNOSIS — M54.16 LUMBAR RADICULOPATHY: Primary | ICD-10-CM

## 2021-06-22 DIAGNOSIS — M54.16 LUMBAR RADICULOPATHY: ICD-10-CM

## 2021-06-22 DIAGNOSIS — M51.26 LUMBAR DISC HERNIATION: ICD-10-CM

## 2021-06-22 PROCEDURE — 1123F ACP DISCUSS/DSCN MKR DOCD: CPT | Performed by: ANESTHESIOLOGY

## 2021-06-22 PROCEDURE — 99204 OFFICE O/P NEW MOD 45 MIN: CPT | Performed by: ANESTHESIOLOGY

## 2021-06-22 PROCEDURE — 72110 X-RAY EXAM L-2 SPINE 4/>VWS: CPT

## 2021-06-22 RX ORDER — METHYLPREDNISOLONE 4 MG/1
TABLET ORAL
Qty: 21 TABLET | Refills: 0 | Status: SHIPPED | OUTPATIENT
Start: 2021-06-22 | End: 2022-03-01

## 2021-06-22 NOTE — PATIENT INSTRUCTIONS
Lumbar Radiculopathy   WHAT YOU NEED TO KNOW:   What is lumbar radiculopathy? Lumbar radiculopathy is a painful condition that happens when a nerve in your lumbar spine (lower back) is pinched or irritated  Nerves control feeling and movement in your body  What causes lumbar radiculopathy? You may get a pinched nerve in your lumbar spine if you have disc damage  Discs are natural, spongy cushions between your vertebrae (back bones) that allow your spine to move  Your discs may move out of place and pinch the nerve in your spine  Your risk for a pinched nerve and lumbar radiculopathy increases if:  · You smoke  · You have diabetes, a spinal infection, or a growth in your spine  · You are overweight  · You are male  · You are elderly  What are the signs and symptoms of lumbar radiculopathy? You may have any of the following:  · Pain that moves from your lower back to your buttocks, groin, and the back of your leg  The pain is often felt below your knee  Your pain may worsen when you cough, sneeze, stand, or sit  · Numbness, weakness, or tingling in your back or legs  How is lumbar radiculopathy diagnosed? Your healthcare provider will examine you and ask about your family history of back and leg pain  He may also test you for weakness, numbness, or tingling in your back, buttocks, and legs  Your healthcare provider may ask you to lie on your back and lift your leg to locate your pain  You may have any of the following:  · Blood tests: You may need blood taken to give healthcare providers information about how your body is working  The blood may be taken from your hand, arm, or IV  · Magnetic resonance imaging (MRI): An MRI machine is used to take a picture of your lower back  Your healthcare provider will use this picture to check for problems and changes in your back bones, nerves, and discs  You will need to lie still during this test  The MRI machine contains a very powerful magnet  Never enter the MRI room with any metal objects  This can cause serious injury  Tell your healthcare provider if you have any metal implants in your body  · X-ray: An x-ray is a picture of your lower back  A lumbar x-ray may show signs of infection or other problems with your spine  · An electromyography (EMG)  test measures the electrical activity of your muscles at rest and with movement  · Computed tomography (CT) scan: A special x-ray machine uses a computer to take pictures of your lower back  It may be used to look at your bones, discs, and nerves  You may be given dye in your IV to help improve the pictures  Tell your healthcare provider if you are allergic to shellfish, or have other allergies or medical conditions  People who are allergic to iodine or shellfish (lobster, crab, or shrimp) may be allergic to some dyes  How is lumbar radiculopathy treated? Treatment of lumbar radiculopathy may reduce pain and swelling, and improve your ability to walk and do your normal activities  Ask your healthcare provider for more information about these and other treatments for lumbar radiculopathy:  · Medicines:     ? NSAIDs , such as ibuprofen, help decrease swelling, pain, and fever  This medicine is available with or without a doctor's order  NSAIDs can cause stomach bleeding or kidney problems in certain people  If you take blood thinner medicine, always ask your healthcare provider if NSAIDs are safe for you  Always read the medicine label and follow directions  ? Muscle relaxers  help decrease pain and muscle spasms  ? Opioids: This is a strong medicine given to reduce severe pain  It is also called narcotic pain medicine  Take this medicine exactly as directed by your healthcare provider  ? Oral steroids: Steroids may be given to reduce swelling and pain  ? Steroid injections: Healthcare providers may give you steroid medicine through a needle (shot) into your lumbar spine   This may help decrease your nerve pain and swelling  You may need more than 1 injection if your symptoms do not improve after the first treatment  · Physical therapy: Your healthcare provider may suggest physical therapy  Your physical therapist may teach you certain exercises to improve posture (the way you stand and sit), flexibility, and strength in your lower back  He may also teach you how to remain safely active and avoid further injury  Follow the exercise plan given to you by your physical therapist     · Transcutaneous electrical nerve stimulation: This treatment, called TENS, stimulates your nerves and may decrease your pain  Wires are attached to pads  The pads are attached to your skin  The wires send a mild current through your nerves  · Surgery: You may need surgery to relieve your pinched nerve if your condition has not improved within 4 to 6 weeks  You may also need it if you have lumbar radiculopathy more than once  What are the risks of treatment for lumbar radiculopathy? · Without treatment, your pain may worsen  The pinched and swollen nerve may lead to problems when you walk or move  In severe cases, you may lose control of your urine or bowel movements  Bedrest can make your symptoms worse  · Pain medicines can cause vomiting, upset stomach, constipation (large, hard bowel movements that are difficult to pass), or kidney or liver problems  Opioid medicine may be addictive (hard to quit taking once you start)  Oral steroids and steroid injections may have side effects, such as facial redness, fluid retention (water weight gain), and mood changes  Steroid injections may be painful and can cause severe headaches, infections, allergic reactions, or nerve damage  Surgery risks include delayed problems with healing, spinal or bladder infection, damage to the spinal cord or other nerves, and ongoing pain  In rare cases, you could become paralyzed (not able to move your arms or legs)      How can I care for myself when I have lumbar radiculopathy? · Stay active: It is best to be active when you have lumbar radiculopathy  Your healthcare provider may tell you to take walks to ease yourself back into your daily routine  Avoid long periods of bed rest  Bed rest could worsen your symptoms  Do not move in ways that increase your pain  Ask your healthcare provider for more information about the best ways to stay active  · Use ice or heat packs:  Use ice or heat packs on the sore area of your body to decrease the pain and swelling  Put ice in a plastic bag covered with a towel on your low back  Cover heated items with a towel to avoid burns  Use ice and heat as directed  · Avoid heavy lifting: Your condition may worsen if you lift heavy things  Avoid lifting if possible  · Maintain a healthy weight:  Excess body weight may strain your back  Talk with your healthcare provider about ways to lose excess weight if you are overweight  When should I contact my healthcare provider? · Your pain does not improve within 1 to 3 weeks after treatment  · Your pain and weakness keep you from your normal activities at work, home, or school  · You lose more than 10 pounds in 6 months without trying  · You become depressed or sad because of the pain  · You have questions or concerns about your condition or care  When should I seek immediate care or call 911? · You have a fever greater than 100 4°F for longer than 2 days  · You have new, severe back or leg pain, or your pain spreads to both legs  · You have any new signs of numbness or weakness, especially in your lower back, legs, arms, or genital area  · You have new trouble controlling your urine and bowel movements  · You do not feel like your bladder empties when you urinate  CARE AGREEMENT:   You have the right to help plan your care  Learn about your health condition and how it may be treated   Discuss treatment options with your healthcare providers to decide what care you want to receive  You always have the right to refuse treatment  The above information is an  only  It is not intended as medical advice for individual conditions or treatments  Talk to your doctor, nurse or pharmacist before following any medical regimen to see if it is safe and effective for you  © Copyright [address] Information is for End User's use only and may not be sold, redistributed or otherwise used for commercial purposes   All illustrations and images included in CareNotes® are the copyrighted property of A D A M , Inc  or [address] [place] [address]

## 2021-06-22 NOTE — PROGRESS NOTES
Assessment  1  Lumbar radiculopathy - Right    2  Lumbar spondylosis    3  Lumbar disc herniation    4  Chronic anticoagulation        Plan    Would Kimberly Schumacher called for the point her pain was severe  It has come down a bit but is still interfering with her daily living activities  We reviewed her CT scan from 2015  It does show a right-sided disc protrusion affecting the L4 nerve root  At this point time I would like to start her on a titrating dose oral methylprednisolone to address any inflammatory component of her pain  She understands she should not take nonsteroidal anti-inflammatories until she is finished with course of the oral steroids and I did review the potential side effects of the medication and she expressed understanding  If she has any problems or questions will give our office a call  Will obtain lumbar radiographs trial any significant pathology  I will follow up in approximately 10 days time with a phone call  Depending on her symptoms we will re-evaluate the plan  My impressions and treatment recommendations were discussed in detail with the patient who verbalized understanding and had no further questions  Discharge instructions were provided  I personally saw and examined the patient and I agree with the above discussed plan of care  This note is created using dictation transcription  It may contain typographical errors, grammatical errors, improperly dictated words, background noise and other errors  Orders Placed This Encounter   Procedures    X-ray lumbar spine complete 4+ views     Standing Status:   Future     Standing Expiration Date:   6/22/2025     Scheduling Instructions:      Bring along any outside films relating to this procedure             New Medications Ordered This Visit   Medications    methylPREDNISolone 4 MG tablet therapy pack     Sig: Use as directed on package     Dispense:  21 tablet     Refill:  0     Referred By: Self  History of Present Illness    Grace Gaspar is a 79 y o  female who I saw back in 2015 presents with a six-month history of right lower extremity pain  She is unaware of any clear precipitating event denies any trauma or injury  When she called for the appointment her pain was worst her pain is somewhat resolved but is still interfering with her daily living activities which she rates as 410 on the visual analog scale  Pain is moderate and constant describes dull achy with subjective weakness of the right lower limb  She reports that walking aggravates her symptoms  Physical therapy in the past did provide good relief  Denies any bowel or bladder dysfunction    I have personally reviewed and/or updated the patient's past medical history, past surgical history, family history, social history, current medications, allergies, and vital signs today  Review of Systems   Constitutional: Negative for fever and unexpected weight change  HENT: Negative for trouble swallowing  Eyes: Negative for visual disturbance  Respiratory: Positive for cough  Negative for shortness of breath and wheezing  Cardiovascular: Negative for chest pain and palpitations  Gastrointestinal: Negative for constipation, diarrhea, nausea and vomiting  Endocrine: Negative for cold intolerance, heat intolerance and polydipsia  Genitourinary: Negative for difficulty urinating and frequency  Musculoskeletal: Positive for myalgias  Negative for arthralgias, gait problem and joint swelling  Skin: Negative for rash  Neurological: Negative for dizziness, seizures, syncope, weakness and headaches  Hematological: Does not bruise/bleed easily  Psychiatric/Behavioral: Negative for dysphoric mood  All other systems reviewed and are negative        Patient Active Problem List   Diagnosis    Atrial fibrillation (HCC)    Chronic anticoagulation    History of colon cancer    Chronic cough       Past Medical History:   Diagnosis Date    Atrial fibrillation ([facility] [address] )     Dr Mcnulty Yaneth [facility])     Chronic anticoagulation     Colon cancer [facility])     History of colon cancer s/p sigmoid resection 2011    Hyperlipidemia     Hypertension     Thyroid disease        Past Surgical History:   Procedure Laterality Date    ATRIAL CARDIAC PACEMAKER INSERTION  2006 and 2014    COLON SIGMOID RESECTION  2011       Family History   Problem Relation Age of Onset    Colon cancer Brother        Social History     Occupational History    Not on file   Tobacco Use    Smoking status: Never Smoker    Smokeless tobacco: Never Used   Vaping Use    Vaping Use: Never used   Substance and Sexual Activity    Alcohol use: Yes    Drug use: Never    Sexual activity: Not on file       Current Outpatient Medications on File Prior to Visit   Medication Sig    atenolol (TENORMIN) 25 mg tablet     COMBIGAN 0 2-0 5 %     ELIQUIS 5 MG     levothyroxine 50 mcg tablet     Multiple Vitamins-Minerals (MULTIVITAMIN ADULT PO) Take by mouth    omeprazole (PriLOSEC) 40 MG capsule     simvastatin (ZOCOR) 10 mg tablet     fluticasone (FLONASE) 50 mcg/act nasal spray  (Patient not taking: Reported on 6/22/2021)    gentamicin (GARAMYCIN) 0 3 % ophthalmic solution Administer 1 drop into the left eye every 4 (four) hours (Patient not taking: Reported on 6/22/2021)    levocetirizine (XYZAL) 5 MG tablet  (Patient not taking: Reported on 6/22/2021)    triamcinolone (KENALOG) 0 1 % cream Apply topically 2 (two) times a day Apply a very small thin layer  Do not get in the eyes  (Patient not taking: Reported on 1/18/2021)    [DISCONTINUED] Methylprednisolone 4 MG TBPK Use as directed on package (Patient not taking: Reported on 1/18/2021)     No current facility-administered medications on file prior to visit         Allergies   Allergen Reactions    Carvedilol Cough    Metoprolol Cough       Physical Exam    /82 (BP Location: Left arm, Patient Position: Sitting, Cuff Size: Standard)   Pulse 80   Temp (!) 97 3 °F (36 3 °C)   Ht 5' 3" (1 6 m)   Wt 69 4 kg (153 lb)   BMI 27 10 kg/m²     Constitutional: normal, well developed, well nourished, alert, in no distress and non-toxic and no overt pain behavior  Eyes: anicteric  HEENT: grossly intact  Neck: supple, symmetric, trachea midline and no masses   Pulmonary:even and unlabored  Cardiovascular:No edema or pitting edema present  Skin:Normal without rashes or lesions and well hydrated  Psychiatric:Mood and affect appropriate  Neurologic:Cranial Nerves II-XII grossly intact  Musculoskeletal:normal, slight difficulty going from sitting to standing sitting position no obvious skin lesions or erythema lumbar sacral spine, no tenderness to palpation lumbar sacral spine spinous process sacroiliac joint or greater trochanter bilateral, deep tendon reflexes are absent right patella 1+ left patella 1+ and symmetrical bilateral Achilles, no motor deficit appreciated lower limbs, no sensory deficit appreciated lower limbs, questionably positive straight leg raising on the right negative on the left    Imaging       CT LUMBAR SPINE  @  11-05-15     INDICATION- Worsening right lower back pain and right leg weakness  Sciatica  No history of trauma  COMPARISON- None  TECHNIQUE-  Contiguous axial images through the lumbar spine were   obtained  Sagittal and coronal reconstructions were performed  Examination was performed utilizing techniques to minimize radiation   dose, including the use of dose reduction software  IMAGE QUALITY-  Diagnostic  FINDINGS-      ALIGNMENT-  Straightening of the lumbar lordosis  No evidence of   spondylolisthesis  VERTEBRAL BODIES-  Osseous structures demineralized  Vertebral body   heights maintained  Degenerative endplate changes N3-V0 and L5-S1  DEGENERATIVE CHANGES-   T12-L1- Normal disc height  Mild facet hypertrophic changes   bilaterally        L1-2-  Mild disc space narrowing  No evidence of disc herniation  Mild facet hypertrophic changes bilaterally as well as ligamentum   flavum hypertrophy  L2-3-  Moderate disc space narrowing  Mild diffuse disc bulge with   superimposed left far lateral disc herniation  This abuts the exiting   left L2 nerve root  Facet hypertrophic changes bilaterally as well as   ligamentum flavum hypertrophy  Mild central stenosis and mild   left-sided neural foraminal narrowing  L3-4-  Moderate disc space narrowing  Mild diffuse disc bulge with   superimposed left paramedian and far lateral disc herniation  This   abuts the exiting left L3 nerve root  Facet hypertrophic changes   bilaterally as well as ligamentum flavum hypertrophy  Moderate central   stenosis, moderate bilateral neural foraminal narrowing, left side   greater than right and mild left lateral recess narrowing  L4-5-  Moderate disc space narrowing  Diffuse disc bulge with   superimposed moderate-sized right paramedian and far lateral disc   herniation  This extends into the right neural foramen and right   lateral recess  Facet hypertrophic changes bilaterally as well as   ligamentum flavum hypertrophy  Moderate central stenosis, severe   right-sided neural foraminal narrowing, moderate left-sided neural   foraminal narrowing and severe right lateral recess narrowing  Right   paramedian and far lateral disc herniation is encroaching upon the   exiting right L4 nerve root as well as the right L5 nerve root in the   lateral recess  There is obliteration of the fat in the right neural   foramen and right lateral recess  (Series 2, image 58)  L5-S1-  Moderate disc space narrowing  Diffuse disc bulge  Facet   hypertrophic changes bilaterally as well as ligamentum flavum   hypertrophy  Moderate central stenosis, moderate bilateral neural   foraminal narrowing and moderate bilateral lateral recess        PARASPINAL SOFT TISSUES-   Normal  IMPRESSION- Degenerative changes of the lumbar spine as described   above  This is most pronounced L4-L5, with involvement of the right L4   and L5 nerve roots  LUMBAR SPINE @  11-5-15     INDICATION-  Worsening low back pain  Right leg weakness  Sciatica  COMPARISON- None      VIEWS-  AP, lateral, bilateral oblique and coned down projections^ 5   images      FINDINGS-      Alignment is unremarkable  There is no radiographic evidence of acute fracture or destructive   osseous lesion  Osseous structures demineralized  Disc space narrowing throughout the   lumbar spine, most pronounced L5-S1  Diffuse facet hypertrophic   changes  Anterior osteophytic spur formation and lateral syndesmophyte formation  Visualized soft tissues appear unremarkable  Incidental note is made of a dual lead pacing device  This would   preclude further evaluation with MRI  IMPRESSION-   1  Degenerative changes lumbar spine  2   Presence of a pacing device  This would preclude further imaging   with MRI  RIGHT HIP @  5-10-15  INDICATION-  Right hip pain and stiffness for a few weeks  COMPARISON- None   VIEWS- AP pelvis and 2 coned down views of the hip    3 images   FINDINGS-   There is no fracture or dislocation  No degenerative changes  No lytic or blastic lesions are seen  Soft tissues are unremarkable  IMPRESSION-   No acute osseous abnormality  I have personally reviewed pertinent films in PACS and my interpretation is Multilevel spondylosis

## 2021-06-22 NOTE — TELEPHONE ENCOUNTER
Call patient in 10 days (7-2-21) to see how patient is feeling with the [facility]

## 2021-07-02 NOTE — TELEPHONE ENCOUNTER
Called and [facility] for patient to see how she is felling from the [address] [place] [address]

## 2021-07-09 NOTE — TELEPHONE ENCOUNTER
S/w pt, stated that she has ~ 75% improvement in her pain s/p oral steroids  Pt stated that she continues to have pain / difficulty w/ steps  + relief when lying in bed, walking without a limp  Pt stated that she would like to wait before pursuing any additional treatment for her pain at this point  Advised pt to cb if questions / concerns arise or if the pain returns  Pt verbalized understanding and appreciation

## 2022-02-27 ENCOUNTER — NURSE TRIAGE (OUTPATIENT)
Dept: OTHER | Facility: OTHER | Age: 82
End: 2022-02-27

## 2022-02-27 DIAGNOSIS — M54.16 LUMBAR RADICULITIS: ICD-10-CM

## 2022-02-27 DIAGNOSIS — M51.26 LUMBAR DISC HERNIATION: Primary | ICD-10-CM

## 2022-02-27 NOTE — TELEPHONE ENCOUNTER
Spoke with patient regarding her back pain  Home care advice provided  Patient requesting office to call her with first available appointment  Patient aware to call back if symptoms worsening or seek care in ED  Patient verbalizes understanding  Reason for Disposition   Caused by a twisting, bending, or lifting injury   [1] MODERATE back pain (e g , interferes with normal activities) AND [2] present > 3 days    Answer Assessment - Initial Assessment Questions  1  ONSET: "When did the pain begin?"         Patient has ongoing issues , follow with Dr Verónica Raymundo  (spine and pain [place])    2  LOCATION: "Where does it hurt?" (upper, mid or lower back)        Right side of back above her hip   Denies radiation , denies numbness or tingling of leg or feet     3  SEVERITY: "How bad is the pain?"  (e g , Scale 1-10; mild, moderate, or severe)    - MILD (1-3): doesn't interfere with normal activities     - MODERATE (4-7): interferes with normal activities or awakens from sleep     - SEVERE (8-10): excruciating pain, unable to do any normal activities         Reports as severe 10 / 10 with certain movements (sitting to go to bathroom)   Pain with walking and steps not as severe but present   Uses cane   Denies falls    4  PATTERN: "Is the pain constant?" (e g , yes, no; constant, intermittent)         Intermittent , severe with certain movements like sitting    5  RADIATION: "Does the pain shoot into your legs or elsewhere?"        Denies   Denies numbness and tingling of foot or leg     6  CAUSE:  "What do you think is causing the back pain?"         Reports she has lumbar disc problems and Dr Verónica Raymnudo  recommended CT if issues continue   Reports she did steroids in June and that helped  7  BACK OVERUSE:  "Any recent lifting of heavy objects, strenuous work or exercise?"        Denies   Ongoing issue , reports "brewing for a while"  Worse on Friday     8   MEDICATIONS: "What have you taken so far for the pain?" (e g , nothing, acetaminophen, NSAIDS)        Tried tylenol and it does not help     9  NEUROLOGIC SYMPTOMS: "Do you have any weakness, numbness, or problems with bowel/bladder control?"        Denies     10   OTHER SYMPTOMS: "Do you have any other symptoms?" (e g , fever, abdominal pain, burning with urination, blood in urine)      Denies all urinary s/s  Denies fever    Protocols used: BACK PAIN-ADULT-AH

## 2022-02-27 NOTE — TELEPHONE ENCOUNTER
Regarding: Severe back pain  ----- Message from Kenan Low sent at 2/27/2022  9:50 AM EST -----  "I am having back pain that almost took my breath away   The pain is on the right side of my back right above the hip "

## 2022-02-28 NOTE — TELEPHONE ENCOUNTER
anti coag:   Faxed anti coag hold form to Dr Kathleen Crawford  Requested Medication to be held: Eliquis  Fax: [phone]  Phone:   Procedure to be scheduled: Right L4 and L5 TFESI x2  ?? *please keep this task in clinical pool until we get response back   (if forwarding this task to another office or physician, please keep clinical pool on the recipient list for tracking purposes)

## 2022-02-28 NOTE — TELEPHONE ENCOUNTER
S/w pt, confirmed return of her R sided low back / top of hip pain  Pt stated that she has had + relief with injections and oral steroids in the past  Per pt, pain started on Saturday night / sunday am and rated her pain 10/10  Pt stated that she walked further than usual ~ 10 days prior  No other obvious cause  Per pt, no relief with tylenol or heat  Pt stated that she did call the office and was advised to start advil  Pt stated that she has + relief w/ Advil and ice  Pt confirmed Eliquis  Advised pt to stop Advil s/t blood thinning medication  Proceed w/ ov w/ DG as scheduled for tomorrow AM  The writer will d/w SL and cb if there is anything additional / change in the plan  Pt verbalized understanding and appreciation

## 2022-02-28 NOTE — TELEPHONE ENCOUNTER
Can schedule right L4 and right L5 TFESI x2, diagnosis lumbar disc herniation, lumbar radiculitis---however keep appointment with DG and call patient today to schedule procedure please

## 2022-03-01 ENCOUNTER — OFFICE VISIT (OUTPATIENT)
Dept: PAIN MEDICINE | Facility: CLINIC | Age: 82
End: 2022-03-01
Payer: MEDICARE

## 2022-03-01 VITALS
DIASTOLIC BLOOD PRESSURE: 64 MMHG | HEIGHT: 63 IN | TEMPERATURE: 98 F | BODY MASS INDEX: 28.17 KG/M2 | WEIGHT: 159 LBS | HEART RATE: 80 BPM | SYSTOLIC BLOOD PRESSURE: 104 MMHG

## 2022-03-01 DIAGNOSIS — M70.61 GREATER TROCHANTERIC BURSITIS OF RIGHT HIP: ICD-10-CM

## 2022-03-01 DIAGNOSIS — M25.551 ACUTE RIGHT HIP PAIN: Primary | ICD-10-CM

## 2022-03-01 PROBLEM — G89.29 CHRONIC BILATERAL LOW BACK PAIN WITHOUT SCIATICA: Status: ACTIVE | Noted: 2022-03-01

## 2022-03-01 PROBLEM — M54.50 CHRONIC BILATERAL LOW BACK PAIN WITHOUT SCIATICA: Status: ACTIVE | Noted: 2022-03-01

## 2022-03-01 PROBLEM — M51.26 LUMBAR DISC HERNIATION: Status: ACTIVE | Noted: 2022-03-01

## 2022-03-01 PROBLEM — M47.816 LUMBAR SPONDYLOSIS: Status: ACTIVE | Noted: 2022-03-01

## 2022-03-01 PROCEDURE — 99214 OFFICE O/P EST MOD 30 MIN: CPT | Performed by: NURSE PRACTITIONER

## 2022-03-01 RX ORDER — MULTIVITAMIN WITH IRON
TABLET ORAL
COMMUNITY

## 2022-03-01 RX ORDER — ASCORBIC ACID 1000 MG
TABLET ORAL
COMMUNITY

## 2022-03-01 NOTE — PROGRESS NOTES
Assessment:  1  Acute right hip pain    2  Greater trochanteric bursitis of right hip        Plan:  While the patient was in the office today, I did discuss with the patient at this point time I actually feel that her pain is coming from the right greater trochanter bursa and is experiencing bursitis and feel would be best to actually change her injection that she is scheduled for on Monday March 7, 2022 to a right greater trochanter bursa injection with Dr Jonas Fairchild and see how she does  I advised the patient that she should continue to take her Eliquis as she does not need to hold that medication for this procedure  The patient was agreeable and verbalized an understanding  Complete risks and benefits including bleeding, infection, tissue reaction, nerve injury and allergic reaction were discussed  The approach was demonstrated using models and literature was provided  Verbal and written consent was obtained  The patient is schedule follow-up office visit 2-3 weeks after her injection at that point time we will re-evaluate her pain symptoms and discuss her treatment plan options  The patient was agreeable and verbalized an understanding  History of Present Illness: The patient is a [de-identified] y o  female last seen on 6/22/21 who presents for a follow up office visit in regards to acute right hip pain secondary to greater trochanter bursitis of the right hip  The patient currently reports that since last Friday she has had a quick onset of right greater trochanter bursa and hip pain and the only thing she can think of that could have possibly cause the onset of the pain was that on her daily walks the day before she did go further than she normally would and maybe she feels she over did it  However, the patient denies any significant trauma or injury and reports that although on Friday and Saturday the pain was severe at times it is better today but still there    The patient denies any significant groin pain as well as any significant lower extremity radicular symptoms and the pain is all in the right lateral aspect of the thigh and lower back and buttocks  The patient presents today for re-evaluation as she is tentatively scheduled for a right L4 and L5 transforaminal epidural steroid injection on Monday with Dr Cosmo Kent and then 2-3 weeks later a 2nd injection if needed  I have personally reviewed and/or updated the patient's past medical history, past surgical history, family history, social history, current medications, allergies, and vital signs today  Review of Systems:    Review of Systems   Respiratory: Negative for shortness of breath  Cardiovascular: Negative for chest pain  Gastrointestinal: Negative for constipation, diarrhea, nausea and vomiting  Musculoskeletal: Positive for gait problem  Negative for arthralgias, joint swelling and myalgias  Skin: Negative for rash  Neurological: Negative for dizziness, seizures and weakness  All other systems reviewed and are negative  Past Medical History:   Diagnosis Date    Atrial fibrillation (HCC)     Dr Manjula Jerez [facility])     Chronic anticoagulation     Colon cancer [facility])     History of colon cancer s/p sigmoid resection 2011    Hyperlipidemia     Hypertension     Thyroid disease        Past Surgical History:   Procedure Laterality Date    ATRIAL CARDIAC PACEMAKER INSERTION  2006 and 2014    COLON SIGMOID RESECTION  2011       Family History   Problem Relation Age of Onset    Colon cancer Brother        Social History     Occupational History    Not on file   Tobacco Use    Smoking status: Never Smoker    Smokeless tobacco: Never Used   Vaping Use    Vaping Use: Never used   Substance and Sexual Activity    Alcohol use:  Yes    Drug use: Never    Sexual activity: Not on file         Current Outpatient Medications:     atenolol (TENORMIN) 25 mg tablet, , Disp: , Rfl:     COMBIGAN 0 2-0 5 %, , Disp: , Rfl:   ELIQUIS 5 MG, , Disp: , Rfl:     levothyroxine 50 mcg tablet, , Disp: , Rfl:     Multiple Vitamins-Minerals (MULTIVITAMIN ADULT PO), Take by mouth, Disp: , Rfl:     simvastatin (ZOCOR) 10 mg tablet, , Disp: , Rfl:     Coenzyme Q10 (Co Q 10) 10 MG CAPS, Take by mouth, Disp: , Rfl:     fluticasone (FLONASE) 50 mcg/act nasal spray, , Disp: , Rfl:     gentamicin (GARAMYCIN) 0 3 % ophthalmic solution, Administer 1 drop into the left eye every 4 (four) hours (Patient not taking: Reported on 6/22/2021), Disp: 5 mL, Rfl: 0    levocetirizine (XYZAL) 5 MG tablet, , Disp: , Rfl:     Omega-3 Fatty Acids (Fish Oil Concentrate) 300 MG CAPS, Take by mouth, Disp: , Rfl:     omeprazole (PriLOSEC) 40 MG capsule, , Disp: , Rfl:     triamcinolone (KENALOG) 0 1 % cream, Apply topically 2 (two) times a day Apply a very small thin layer  Do not get in the eyes  (Patient not taking: Reported on 1/18/2021), Disp: 30 g, Rfl: 0    Allergies   Allergen Reactions    Carvedilol Cough    Metoprolol Cough       Physical Exam:    /64 (BP Location: Left arm, Patient Position: Sitting, Cuff Size: Standard)   Pulse 80   Temp 98 °F (36 7 °C)   Ht 5' 3" (1 6 m)   Wt 72 1 kg (159 lb)   BMI 28 17 kg/m²     Constitutional:normal, well developed, well nourished, alert, in no distress and non-toxic and no overt pain behavior  Eyes:anicteric  HEENT:grossly intact  Neck:supple, symmetric, trachea midline and no masses   Pulmonary:even and unlabored  Cardiovascular:No edema or pitting edema present  Skin:Normal without rashes or lesions and well hydrated  Psychiatric:Mood and affect appropriate  Neurologic:Cranial Nerves II-XII grossly intact  Musculoskeletal:The patient's gait is slightly antalgic, but steady without the use of any assistive devices  The patient does have significant tenderness noted upon exam of the right greater trochanter bursa and tenderness of the right lateral aspect of her thigh    No significant tenderness of the right lumbosacral spine and paravertebral musculature or SI joint        Imaging  FL spine and pain procedure    (Results Pending)         Orders Placed This Encounter   Procedures    FL spine and pain procedure

## 2022-03-07 ENCOUNTER — HOSPITAL ENCOUNTER (OUTPATIENT)
Dept: RADIOLOGY | Facility: CLINIC | Age: 82
Discharge: HOME | End: 2022-03-07
Attending: ANESTHESIOLOGY
Payer: MEDICARE

## 2022-03-07 VITALS
RESPIRATION RATE: 18 BRPM | HEART RATE: 88 BPM | SYSTOLIC BLOOD PRESSURE: 113 MMHG | TEMPERATURE: 97.3 F | OXYGEN SATURATION: 93 % | DIASTOLIC BLOOD PRESSURE: 80 MMHG

## 2022-03-07 DIAGNOSIS — M70.61 GREATER TROCHANTERIC BURSITIS OF RIGHT HIP: ICD-10-CM

## 2022-03-07 DIAGNOSIS — M25.551 ACUTE RIGHT HIP PAIN: ICD-10-CM

## 2022-03-07 PROCEDURE — 77002 NEEDLE LOCALIZATION BY XRAY: CPT | Performed by: ANESTHESIOLOGY

## 2022-03-07 PROCEDURE — 20610 DRAIN/INJ JOINT/BURSA W/O US: CPT | Performed by: ANESTHESIOLOGY

## 2022-03-07 PROCEDURE — 77002 NEEDLE LOCALIZATION BY XRAY: CPT

## 2022-03-07 RX ORDER — METHYLPREDNISOLONE ACETATE 80 MG/ML
80 INJECTION, SUSPENSION INTRA-ARTICULAR; INTRALESIONAL; INTRAMUSCULAR; PARENTERAL; SOFT TISSUE ONCE
Status: COMPLETED | OUTPATIENT
Start: 2022-03-07 | End: 2022-03-07

## 2022-03-07 RX ADMIN — LIDOCAINE HYDROCHLORIDE 2 ML: 20 INJECTION, SOLUTION EPIDURAL; INFILTRATION; INTRACAUDAL at 10:04

## 2022-03-07 RX ADMIN — METHYLPREDNISOLONE ACETATE 80 MG: 80 INJECTION, SUSPENSION INTRA-ARTICULAR; INTRALESIONAL; INTRAMUSCULAR; SOFT TISSUE at 10:04

## 2022-03-07 RX ADMIN — IOHEXOL 0.5 ML: 300 INJECTION, SOLUTION INTRAVENOUS at 10:04

## 2022-03-07 NOTE — DISCHARGE INSTRUCTIONS

## 2022-03-07 NOTE — H&P
History of Present Illness: The patient is a [de-identified] y o  female who presents with complaints of hip pain  Patient Active Problem List   Diagnosis    Atrial fibrillation ([facility] [address] )    Chronic anticoagulation    History of colon cancer    Chronic cough    Chronic bilateral low back pain without sciatica    Lumbar disc herniation    Lumbar spondylosis    Acute right hip pain    Greater trochanteric bursitis of right hip       Past Medical History:   Diagnosis Date    Atrial fibrillation (HCC)     Dr Olivera [facility])     Chronic anticoagulation     Colon cancer ([facility] [address] )     History of colon cancer s/p sigmoid resection 2011    Hyperlipidemia     Hypertension     Thyroid disease        Past Surgical History:   Procedure Laterality Date    ATRIAL CARDIAC PACEMAKER INSERTION  2006 and 2014    COLON SIGMOID RESECTION  2011         Current Outpatient Medications:     atenolol (TENORMIN) 25 mg tablet, , Disp: , Rfl:     Coenzyme Q10 (Co Q 10) 10 MG CAPS, Take by mouth, Disp: , Rfl:     COMBIGAN 0 2-0 5 %, , Disp: , Rfl:     ELIQUIS 5 MG, , Disp: , Rfl:     fluticasone (FLONASE) 50 mcg/act nasal spray, , Disp: , Rfl:     gentamicin (GARAMYCIN) 0 3 % ophthalmic solution, Administer 1 drop into the left eye every 4 (four) hours (Patient not taking: Reported on 6/22/2021), Disp: 5 mL, Rfl: 0    levocetirizine (XYZAL) 5 MG tablet, , Disp: , Rfl:     levothyroxine 50 mcg tablet, , Disp: , Rfl:     Multiple Vitamins-Minerals (MULTIVITAMIN ADULT PO), Take by mouth, Disp: , Rfl:     Omega-3 Fatty Acids (Fish Oil Concentrate) 300 MG CAPS, Take by mouth, Disp: , Rfl:     omeprazole (PriLOSEC) 40 MG capsule, , Disp: , Rfl:     simvastatin (ZOCOR) 10 mg tablet, , Disp: , Rfl:     triamcinolone (KENALOG) 0 1 % cream, Apply topically 2 (two) times a day Apply a very small thin layer  Do not get in the eyes   (Patient not taking: Reported on 1/18/2021), Disp: 30 g, Rfl: 0    Current Facility-Administered Medications:     iohexol (OMNIPAQUE) 300 mg/mL injection 50 mL, 50 mL, Intra-articular, Once, Yuan Streeter DO    lidocaine (PF) (XYLOCAINE-MPF) 2 % injection 5 mL, 5 mL, Intra-articular, Once, Yuan Streeter DO    methylPREDNISolone acetate (DEPO-MEDROL) injection 80 mg, 80 mg, Intra-articular, Once, Yuan Streeter DO    Allergies   Allergen Reactions    Carvedilol Cough    Metoprolol Cough       Physical Exam:   Vitals:    03/07/22 0951   BP: 120/81   Pulse: 80   Resp: 18   Temp: (!) 97 3 °F (36 3 °C)   SpO2: 95%     General: Awake, Alert, Oriented x 3, Mood and affect appropriate  Respiratory: Respirations even and unlabored  Cardiovascular: Peripheral pulses intact; no edema  Musculoskeletal Exam:  Slightly antalgic gait    ASA Score: II    Patient/Chart Verification  Patient ID Verified: Verbal  ID Band Applied: No  Consents Confirmed: To be obtained in the Pre-Procedure area,Procedural  H&P( within 30 days) Verified: To be obtained in the Pre-Procedure area  Interval H&P(within 24 hr) Complete (required for Outpatients and Surgery Admit only): To be obtained in the Pre-Procedure area  Allergies Reviewed: Yes  Anticoag/NSAID held?: NA  Currently on antibiotics?: No  Pregnancy denied?: NA    Assessment:   1  Acute right hip pain    2   Greater trochanteric bursitis of right hip        Plan: Right Greater Troch Bursa Injection

## 2022-03-14 ENCOUNTER — TELEPHONE (OUTPATIENT)
Dept: PAIN MEDICINE | Facility: MEDICAL CENTER | Age: 82
End: 2022-03-14

## 2022-03-14 NOTE — TELEPHONE ENCOUNTER
1st attempt call, left msg to call back with % of improvement and pain level      S/p Right GTBINJ 3/7    f/u 4/26

## 2023-02-22 ENCOUNTER — OFFICE VISIT (OUTPATIENT)
Dept: PAIN MEDICINE | Facility: CLINIC | Age: 83
End: 2023-02-22

## 2023-02-22 VITALS
BODY MASS INDEX: 27.82 KG/M2 | DIASTOLIC BLOOD PRESSURE: 84 MMHG | TEMPERATURE: 98 F | HEIGHT: 63 IN | SYSTOLIC BLOOD PRESSURE: 122 MMHG | HEART RATE: 73 BPM | WEIGHT: 157 LBS

## 2023-02-22 DIAGNOSIS — M46.1 SACROILIITIS (HCC): Primary | ICD-10-CM

## 2023-02-22 DIAGNOSIS — M70.61 TROCHANTERIC BURSITIS OF RIGHT HIP: ICD-10-CM

## 2023-02-22 DIAGNOSIS — M47.816 LUMBAR SPONDYLOSIS: ICD-10-CM

## 2023-02-22 RX ORDER — RIVAROXABAN 20 MG/1
TABLET, FILM COATED ORAL
COMMUNITY
Start: 2023-01-31

## 2023-02-22 RX ORDER — METHYLPREDNISOLONE 4 MG/1
TABLET ORAL
Qty: 21 TABLET | Refills: 0 | Status: SHIPPED | OUTPATIENT
Start: 2023-02-22

## 2023-02-22 NOTE — PROGRESS NOTES
Assessment  1  Sacroiliitis ([address] ) - Right    2  Trochanteric bursitis of right hip    3  Lumbar spondylosis        Plan    Pleasant 71-year-old female with 6-week history of right-sided low back and hip pain however has subsequently resolved  She is concerned as she is going on a trip for 2 weeks  I explained that injection therapy does not provide any preventative measures  However I did prescribe a Medrol dose pack that if while on vacation she has a flareup of her pain  If she does take the Medrol Dosepak which I did review the potential side effects of the medication she understands not to take any nonsteroidal anti-inflammatories  She will follow-up when she returns  My impressions and treatment recommendations were discussed in detail with the patient who verbalized understanding and had no further questions  Discharge instructions were provided  I personally saw and examined the patient and I agree with the above discussed plan of care  This note is created using dictation transcription  It may contain typographical errors, grammatical errors, improperly dictated words, background noise and other errors  New Medications Ordered This Visit   Medications   • Xarelto 20 MG tablet     Sig: TAKE 1 TABLET BY MOUTH AT BEDTIME WITH EVENING MEAL   • methylPREDNISolone 4 MG tablet therapy pack     Sig: Use as directed on package     Dispense:  21 tablet     Refill:  0       History of Present Illness    Nathalie Rosenberg is a 81 y o  female with 6-week history of resolving low back and right hip pain  She is concerned and as she is leaving for vacation next week  She was inquiring about a bursa injection  Her pain was worse at night describes constant burning and achy her pain does not interfere with her daily living activities      I have personally reviewed and/or updated the patient's past medical history, past surgical history, family history, social history, current medications, allergies, and vital signs today  Review of Systems   Constitutional: Negative for fever and unexpected weight change  HENT: Negative for trouble swallowing  Eyes: Negative for visual disturbance  Respiratory: Negative for shortness of breath and wheezing  Cardiovascular: Negative for chest pain and palpitations  Gastrointestinal: Negative for constipation, diarrhea, nausea and vomiting  Endocrine: Negative for cold intolerance, heat intolerance and polydipsia  Genitourinary: Negative for difficulty urinating and frequency  Musculoskeletal: Negative for arthralgias, gait problem, joint swelling (Joint Stiffness) and myalgias  Skin: Negative for rash  Neurological: Negative for dizziness, seizures, syncope, weakness and headaches  Hematological: Does not bruise/bleed easily  Psychiatric/Behavioral: Negative for dysphoric mood  All other systems reviewed and are negative  Patient Active Problem List   Diagnosis   • Atrial fibrillation (HCC)   • Chronic anticoagulation   • History of colon cancer   • Chronic cough   • Chronic bilateral low back pain without sciatica   • Lumbar disc herniation   • Lumbar spondylosis   • Acute right hip pain   • Greater trochanteric bursitis of right hip       Past Medical History:   Diagnosis Date   • Atrial fibrillation (HCC)     Dr Mariam Polo   • Cancer [facility])    • Chronic anticoagulation    • Colon cancer [facility])    • History of colon cancer s/p sigmoid resection 2011   • Hyperlipidemia    • Hypertension    • Thyroid disease        Past Surgical History:   Procedure Laterality Date   • ATRIAL CARDIAC PACEMAKER INSERTION  2006 and 2014   • COLON SIGMOID RESECTION  2011       Family History   Problem Relation Age of Onset   • Colon cancer Brother        Social History     Occupational History   • Not on file   Tobacco Use   • Smoking status: Never   • Smokeless tobacco: Never   Vaping Use   • Vaping Use: Never used   Substance and Sexual Activity   • Alcohol use:  Yes • Drug use: Never   • Sexual activity: Not on file       Current Outpatient Medications on File Prior to Visit   Medication Sig   • atenolol (TENORMIN) 25 mg tablet    • Coenzyme Q10 (Co Q 10) 10 MG CAPS Take by mouth   • COMBIGAN 0 2-0 5 %    • levothyroxine 50 mcg tablet    • Multiple Vitamins-Minerals (MULTIVITAMIN ADULT PO) Take by mouth   • Omega-3 Fatty Acids (Fish Oil Concentrate) 300 MG CAPS Take by mouth   • simvastatin (ZOCOR) 10 mg tablet    • Xarelto 20 MG tablet TAKE 1 TABLET BY MOUTH AT BEDTIME WITH EVENING MEAL   • [DISCONTINUED] ELIQUIS 5 MG    • [DISCONTINUED] fluticasone (FLONASE) 50 mcg/act nasal spray  (Patient not taking: Reported on 6/22/2021)   • [DISCONTINUED] gentamicin (GARAMYCIN) 0 3 % ophthalmic solution Administer 1 drop into the left eye every 4 (four) hours (Patient not taking: Reported on 6/22/2021)   • [DISCONTINUED] levocetirizine (XYZAL) 5 MG tablet  (Patient not taking: Reported on 6/22/2021)   • [DISCONTINUED] omeprazole (PriLOSEC) 40 MG capsule  (Patient not taking: Reported on 3/1/2022 )   • [DISCONTINUED] triamcinolone (KENALOG) 0 1 % cream Apply topically 2 (two) times a day Apply a very small thin layer  Do not get in the eyes  (Patient not taking: Reported on 1/18/2021)     No current facility-administered medications on file prior to visit  Allergies   Allergen Reactions   • Carvedilol Cough   • Metoprolol Cough       Physical Exam    /84 (BP Location: Left arm, Patient Position: Sitting, Cuff Size: Standard)   Pulse 73   Temp 98 °F (36 7 °C)   Ht 5' 3" (1 6 m)   Wt 71 2 kg (157 lb)   BMI 27 81 kg/m²     Constitutional: normal, well developed, well nourished, alert, in no distress and non-toxic and no overt pain behavior    Eyes: anicteric  HEENT: grossly intact  Neck: supple, symmetric, trachea midline and no masses   Pulmonary:even and unlabored  Cardiovascular:No edema or pitting edema present  Skin:Normal without rashes or lesions and well hydrated  Psychiatric:Mood and affect appropriate  Neurologic:Cranial Nerves II-XII grossly intact  Musculoskeletal:normal, difficulty going from sitting to standing sitting position; no obvious skin lesions or erythema lumbar sacral spine; mild tenderness in lumbar paravertebrals, no sacroiliac or greater trochanteric tenderness bilateral; deep tendon reflexes are diminished but symmetrical bilateral patellar and achilles; no focal motor deficit appreciated lower limbs; negative bilateral straight leg raising  Imaging  LUMBAR SPINE @  6-22-21     INDICATION:   M54 [id number]: Radiculopathy, lumbar region      COMPARISON:  None     VIEWS:  XR SPINE LUMBAR MINIMUM 4 VIEWS NON INJURY        FINDINGS:     There are 5 non rib bearing lumbar vertebral bodies       There is no evidence of acute fracture or destructive osseous lesion      Alignment is unremarkable  Multilevel anterior osteophytes  Degenerative disc changes at L2-L3, L5-S1  Mild anterolisthesis of L5 over S1  Facet sclerosis at L5-S1      Soft tissues are unremarkable      IMPRESSION:  Degenerative changes most predominant at L5-S1  I have personally reviewed pertinent films in PACS and my interpretation is Lumbar spondylosis

## 2025-05-21 ENCOUNTER — HOSPITAL ENCOUNTER (INPATIENT)
Facility: HOSPITAL | Age: 85
LOS: 3 days | Discharge: HOME | DRG: 694 | End: 2025-05-24
Attending: EMERGENCY MEDICINE | Admitting: FAMILY MEDICINE
Payer: MEDICARE

## 2025-05-21 ENCOUNTER — APPOINTMENT (EMERGENCY)
Dept: CT IMAGING | Facility: HOSPITAL | Age: 85
DRG: 694 | End: 2025-05-21
Payer: MEDICARE

## 2025-05-21 ENCOUNTER — APPOINTMENT (EMERGENCY)
Dept: RADIOLOGY | Facility: HOSPITAL | Age: 85
DRG: 694 | End: 2025-05-21
Payer: MEDICARE

## 2025-05-21 DIAGNOSIS — I42.9 CARDIOMYOPATHY, UNSPECIFIED TYPE (HCC): ICD-10-CM

## 2025-05-21 DIAGNOSIS — I25.5 ISCHEMIC CARDIOMYOPATHY: ICD-10-CM

## 2025-05-21 DIAGNOSIS — R07.9 CHEST PAIN: Primary | ICD-10-CM

## 2025-05-21 DIAGNOSIS — N20.1 URETEROLITHIASIS: ICD-10-CM

## 2025-05-21 DIAGNOSIS — R79.89 ELEVATED TROPONIN: ICD-10-CM

## 2025-05-21 PROBLEM — E03.9 HYPOTHYROIDISM: Status: ACTIVE | Noted: 2025-05-21

## 2025-05-21 LAB
2HR DELTA HS TROPONIN: 177 NG/L
4HR DELTA HS TROPONIN: 365 NG/L
ALBUMIN SERPL BCG-MCNC: 4.3 G/DL (ref 3.5–5)
ALP SERPL-CCNC: 90 U/L (ref 34–104)
ALT SERPL W P-5'-P-CCNC: 25 U/L (ref 7–52)
ANION GAP SERPL CALCULATED.3IONS-SCNC: 10 MMOL/L (ref 4–13)
APTT PPP: 28 SECONDS (ref 23–34)
AST SERPL W P-5'-P-CCNC: 27 U/L (ref 13–39)
BACTERIA UR QL AUTO: ABNORMAL /HPF
BASOPHILS # BLD AUTO: 0.03 THOUSANDS/ÂΜL (ref 0–0.1)
BASOPHILS NFR BLD AUTO: 0 % (ref 0–1)
BILIRUB SERPL-MCNC: 0.58 MG/DL (ref 0.2–1)
BILIRUB UR QL STRIP: NEGATIVE
BUN SERPL-MCNC: 20 MG/DL (ref 5–25)
CALCIUM SERPL-MCNC: 9.5 MG/DL (ref 8.4–10.2)
CARDIAC TROPONIN I PNL SERPL HS: 115 NG/L (ref ?–50)
CARDIAC TROPONIN I PNL SERPL HS: 292 NG/L (ref ?–50)
CARDIAC TROPONIN I PNL SERPL HS: 480 NG/L (ref ?–50)
CHLORIDE SERPL-SCNC: 102 MMOL/L (ref 96–108)
CLARITY UR: ABNORMAL
CO2 SERPL-SCNC: 24 MMOL/L (ref 21–32)
COLOR UR: YELLOW
CREAT SERPL-MCNC: 1.1 MG/DL (ref 0.6–1.3)
EOSINOPHIL # BLD AUTO: 0.1 THOUSAND/ÂΜL (ref 0–0.61)
EOSINOPHIL NFR BLD AUTO: 1 % (ref 0–6)
ERYTHROCYTE [DISTWIDTH] IN BLOOD BY AUTOMATED COUNT: 12.5 % (ref 11.6–15.1)
GFR SERPL CREATININE-BSD FRML MDRD: 46 ML/MIN/1.73SQ M
GLUCOSE SERPL-MCNC: 213 MG/DL (ref 65–140)
GLUCOSE UR STRIP-MCNC: NEGATIVE MG/DL
HCT VFR BLD AUTO: 46.9 % (ref 34.8–46.1)
HGB BLD-MCNC: 14.8 G/DL (ref 11.5–15.4)
HGB UR QL STRIP.AUTO: ABNORMAL
IMM GRANULOCYTES # BLD AUTO: 0.05 THOUSAND/UL (ref 0–0.2)
IMM GRANULOCYTES NFR BLD AUTO: 0 % (ref 0–2)
INR PPP: 1.44 (ref 0.85–1.19)
KETONES UR STRIP-MCNC: NEGATIVE MG/DL
LEUKOCYTE ESTERASE UR QL STRIP: NEGATIVE
LIPASE SERPL-CCNC: 55 U/L (ref 11–82)
LYMPHOCYTES # BLD AUTO: 2.51 THOUSANDS/ÂΜL (ref 0.6–4.47)
LYMPHOCYTES NFR BLD AUTO: 22 % (ref 14–44)
MCH RBC QN AUTO: 30.3 PG (ref 26.8–34.3)
MCHC RBC AUTO-ENTMCNC: 31.6 G/DL (ref 31.4–37.4)
MCV RBC AUTO: 96 FL (ref 82–98)
MONOCYTES # BLD AUTO: 0.62 THOUSAND/ÂΜL (ref 0.17–1.22)
MONOCYTES NFR BLD AUTO: 6 % (ref 4–12)
NEUTROPHILS # BLD AUTO: 7.9 THOUSANDS/ÂΜL (ref 1.85–7.62)
NEUTS SEG NFR BLD AUTO: 71 % (ref 43–75)
NITRITE UR QL STRIP: NEGATIVE
NON-SQ EPI CELLS URNS QL MICRO: ABNORMAL /HPF
NRBC BLD AUTO-RTO: 0 /100 WBCS
PH UR STRIP.AUTO: 5.5 [PH]
PLATELET # BLD AUTO: 261 THOUSANDS/UL (ref 149–390)
PMV BLD AUTO: 9.2 FL (ref 8.9–12.7)
POTASSIUM SERPL-SCNC: 4.3 MMOL/L (ref 3.5–5.3)
PROT SERPL-MCNC: 7.3 G/DL (ref 6.4–8.4)
PROT UR STRIP-MCNC: ABNORMAL MG/DL
PROTHROMBIN TIME: 18 SECONDS (ref 12.3–15)
RBC # BLD AUTO: 4.88 MILLION/UL (ref 3.81–5.12)
RBC #/AREA URNS AUTO: ABNORMAL /HPF
SODIUM SERPL-SCNC: 136 MMOL/L (ref 135–147)
SP GR UR STRIP.AUTO: 1.02 (ref 1–1.03)
UROBILINOGEN UR STRIP-ACNC: <2 MG/DL
WBC # BLD AUTO: 11.21 THOUSAND/UL (ref 4.31–10.16)
WBC #/AREA URNS AUTO: ABNORMAL /HPF

## 2025-05-21 PROCEDURE — 84484 ASSAY OF TROPONIN QUANT: CPT | Performed by: EMERGENCY MEDICINE

## 2025-05-21 PROCEDURE — 99223 1ST HOSP IP/OBS HIGH 75: CPT | Performed by: PHYSICIAN ASSISTANT

## 2025-05-21 PROCEDURE — 71045 X-RAY EXAM CHEST 1 VIEW: CPT

## 2025-05-21 PROCEDURE — 99291 CRITICAL CARE FIRST HOUR: CPT | Performed by: EMERGENCY MEDICINE

## 2025-05-21 PROCEDURE — 81001 URINALYSIS AUTO W/SCOPE: CPT | Performed by: EMERGENCY MEDICINE

## 2025-05-21 PROCEDURE — 85610 PROTHROMBIN TIME: CPT | Performed by: EMERGENCY MEDICINE

## 2025-05-21 PROCEDURE — 36415 COLL VENOUS BLD VENIPUNCTURE: CPT | Performed by: EMERGENCY MEDICINE

## 2025-05-21 PROCEDURE — 85025 COMPLETE CBC W/AUTO DIFF WBC: CPT | Performed by: EMERGENCY MEDICINE

## 2025-05-21 PROCEDURE — 99285 EMERGENCY DEPT VISIT HI MDM: CPT

## 2025-05-21 PROCEDURE — 93005 ELECTROCARDIOGRAM TRACING: CPT

## 2025-05-21 PROCEDURE — 96375 TX/PRO/DX INJ NEW DRUG ADDON: CPT

## 2025-05-21 PROCEDURE — 96374 THER/PROPH/DIAG INJ IV PUSH: CPT

## 2025-05-21 PROCEDURE — 85730 THROMBOPLASTIN TIME PARTIAL: CPT | Performed by: EMERGENCY MEDICINE

## 2025-05-21 PROCEDURE — 73502 X-RAY EXAM HIP UNI 2-3 VIEWS: CPT

## 2025-05-21 PROCEDURE — 80053 COMPREHEN METABOLIC PANEL: CPT | Performed by: EMERGENCY MEDICINE

## 2025-05-21 PROCEDURE — 83690 ASSAY OF LIPASE: CPT | Performed by: EMERGENCY MEDICINE

## 2025-05-21 PROCEDURE — 74177 CT ABD & PELVIS W/CONTRAST: CPT

## 2025-05-21 RX ORDER — SODIUM CHLORIDE, SODIUM GLUCONATE, SODIUM ACETATE, POTASSIUM CHLORIDE, MAGNESIUM CHLORIDE, SODIUM PHOSPHATE, DIBASIC, AND POTASSIUM PHOSPHATE .53; .5; .37; .037; .03; .012; .00082 G/100ML; G/100ML; G/100ML; G/100ML; G/100ML; G/100ML; G/100ML
100 INJECTION, SOLUTION INTRAVENOUS CONTINUOUS
Status: DISCONTINUED | OUTPATIENT
Start: 2025-05-21 | End: 2025-05-22

## 2025-05-21 RX ORDER — PRAVASTATIN SODIUM 20 MG
20 TABLET ORAL
Status: DISCONTINUED | OUTPATIENT
Start: 2025-05-21 | End: 2025-05-24 | Stop reason: HOSPADM

## 2025-05-21 RX ORDER — ASPIRIN 81 MG/1
324 TABLET, CHEWABLE ORAL ONCE
Status: COMPLETED | OUTPATIENT
Start: 2025-05-21 | End: 2025-05-21

## 2025-05-21 RX ORDER — LEVOTHYROXINE SODIUM 50 UG/1
50 TABLET ORAL
Status: DISCONTINUED | OUTPATIENT
Start: 2025-05-22 | End: 2025-05-24 | Stop reason: HOSPADM

## 2025-05-21 RX ORDER — HYDROMORPHONE HCL/PF 1 MG/ML
0.5 SYRINGE (ML) INJECTION ONCE
Status: COMPLETED | OUTPATIENT
Start: 2025-05-21 | End: 2025-05-21

## 2025-05-21 RX ORDER — SENNOSIDES 8.6 MG
1 TABLET ORAL
Status: DISCONTINUED | OUTPATIENT
Start: 2025-05-21 | End: 2025-05-24 | Stop reason: HOSPADM

## 2025-05-21 RX ORDER — ACETAMINOPHEN 325 MG/1
650 TABLET ORAL EVERY 6 HOURS PRN
Status: DISCONTINUED | OUTPATIENT
Start: 2025-05-21 | End: 2025-05-24 | Stop reason: HOSPADM

## 2025-05-21 RX ORDER — TIMOLOL MALEATE 5 MG/ML
1 SOLUTION/ DROPS OPHTHALMIC 2 TIMES DAILY
Status: DISCONTINUED | OUTPATIENT
Start: 2025-05-21 | End: 2025-05-24 | Stop reason: HOSPADM

## 2025-05-21 RX ORDER — BRIMONIDINE TARTRATE 2 MG/ML
1 SOLUTION/ DROPS OPHTHALMIC 2 TIMES DAILY
Status: DISCONTINUED | OUTPATIENT
Start: 2025-05-21 | End: 2025-05-24 | Stop reason: HOSPADM

## 2025-05-21 RX ORDER — NETARSUDIL 0.2 MG/ML
1 SOLUTION/ DROPS OPHTHALMIC; TOPICAL
COMMUNITY
Start: 2025-04-24

## 2025-05-21 RX ORDER — CALCIUM CARBONATE 500(1250)
500 TABLET ORAL DAILY
Status: DISCONTINUED | OUTPATIENT
Start: 2025-05-22 | End: 2025-05-24 | Stop reason: HOSPADM

## 2025-05-21 RX ORDER — IBUPROFEN 200 MG
1 CAPSULE ORAL DAILY
COMMUNITY

## 2025-05-21 RX ORDER — ATENOLOL 50 MG/1
25 TABLET ORAL 2 TIMES DAILY
Status: DISCONTINUED | OUTPATIENT
Start: 2025-05-21 | End: 2025-05-22

## 2025-05-21 RX ORDER — MAGNESIUM HYDROXIDE/ALUMINUM HYDROXICE/SIMETHICONE 120; 1200; 1200 MG/30ML; MG/30ML; MG/30ML
30 SUSPENSION ORAL EVERY 6 HOURS PRN
Status: DISCONTINUED | OUTPATIENT
Start: 2025-05-21 | End: 2025-05-24 | Stop reason: HOSPADM

## 2025-05-21 RX ORDER — CHLORAL HYDRATE 500 MG
1000 CAPSULE ORAL DAILY
Status: DISCONTINUED | OUTPATIENT
Start: 2025-05-22 | End: 2025-05-24 | Stop reason: HOSPADM

## 2025-05-21 RX ORDER — ONDANSETRON 2 MG/ML
4 INJECTION INTRAMUSCULAR; INTRAVENOUS ONCE
Status: COMPLETED | OUTPATIENT
Start: 2025-05-21 | End: 2025-05-21

## 2025-05-21 RX ORDER — ACETAMINOPHEN 325 MG/1
650 TABLET ORAL EVERY 6 HOURS PRN
Status: DISCONTINUED | OUTPATIENT
Start: 2025-05-21 | End: 2025-05-21

## 2025-05-21 RX ORDER — OXYCODONE HYDROCHLORIDE 5 MG/1
5 TABLET ORAL EVERY 6 HOURS PRN
Refills: 0 | Status: DISCONTINUED | OUTPATIENT
Start: 2025-05-21 | End: 2025-05-24 | Stop reason: HOSPADM

## 2025-05-21 RX ADMIN — BRIMONIDINE TARTRATE 1 DROP: 2 SOLUTION/ DROPS OPHTHALMIC at 23:49

## 2025-05-21 RX ADMIN — TIMOLOL MALEATE 1 DROP: 5 SOLUTION OPHTHALMIC at 23:49

## 2025-05-21 RX ADMIN — SENNOSIDES 8.6 MG: 8.6 TABLET, FILM COATED ORAL at 23:49

## 2025-05-21 RX ADMIN — IOHEXOL 100 ML: 350 INJECTION, SOLUTION INTRAVENOUS at 19:33

## 2025-05-21 RX ADMIN — SODIUM CHLORIDE, SODIUM GLUCONATE, SODIUM ACETATE, POTASSIUM CHLORIDE, MAGNESIUM CHLORIDE, SODIUM PHOSPHATE, DIBASIC, AND POTASSIUM PHOSPHATE 100 ML/HR: .53; .5; .37; .037; .03; .012; .00082 INJECTION, SOLUTION INTRAVENOUS at 23:59

## 2025-05-21 RX ADMIN — RIVAROXABAN 15 MG: 15 TABLET, FILM COATED ORAL at 23:49

## 2025-05-21 RX ADMIN — ASPIRIN 81 MG CHEWABLE TABLET 324 MG: 81 TABLET CHEWABLE at 19:48

## 2025-05-21 RX ADMIN — ONDANSETRON 4 MG: 2 INJECTION, SOLUTION INTRAMUSCULAR; INTRAVENOUS at 18:54

## 2025-05-21 RX ADMIN — HYDROMORPHONE HYDROCHLORIDE 0.5 MG: 1 INJECTION, SOLUTION INTRAMUSCULAR; INTRAVENOUS; SUBCUTANEOUS at 19:52

## 2025-05-21 RX ADMIN — ATENOLOL 25 MG: 50 TABLET ORAL at 23:49

## 2025-05-21 RX ADMIN — PRAVASTATIN SODIUM 20 MG: 20 TABLET ORAL at 23:49

## 2025-05-22 ENCOUNTER — APPOINTMENT (INPATIENT)
Dept: NON INVASIVE DIAGNOSTICS | Facility: HOSPITAL | Age: 85
DRG: 694 | End: 2025-05-22
Payer: MEDICARE

## 2025-05-22 LAB
ANION GAP SERPL CALCULATED.3IONS-SCNC: 11 MMOL/L (ref 4–13)
AORTIC ROOT: 3.2 CM
AORTIC VALVE MEAN VELOCITY: 5.1 M/S
ASCENDING AORTA: 3.3 CM
AV AREA BY CONTINUOUS VTI: 2 CM2
AV AREA PEAK VELOCITY: 2.2 CM2
AV LVOT MEAN GRADIENT: 1 MMHG
AV LVOT PEAK GRADIENT: 1 MMHG
AV MEAN PRESS GRAD SYS DOP V1V2: 1 MMHG
AV ORIFICE AREA US: 1.99 CM2
AV PEAK GRADIENT: 3 MMHG
AV VELOCITY RATIO: 0.57
BSA FOR ECHO PROCEDURE: 1.67 M2
BUN SERPL-MCNC: 19 MG/DL (ref 5–25)
CALCIUM SERPL-MCNC: 9.1 MG/DL (ref 8.4–10.2)
CARDIAC TROPONIN I PNL SERPL HS: 464 NG/L (ref ?–50)
CHLORIDE SERPL-SCNC: 105 MMOL/L (ref 96–108)
CO2 SERPL-SCNC: 21 MMOL/L (ref 21–32)
CREAT SERPL-MCNC: 0.82 MG/DL (ref 0.6–1.3)
DOP CALC AO VTI: 18.1 CM
DOP CALC LVOT AREA: 3.46
DOP CALC LVOT DIAMETER: 2.1 CM
DOP CALC LVOT PEAK VEL VTI: 10.4 CM
DOP CALC LVOT PEAK VEL: 0.55 M/S
DOP CALC LVOT STROKE INDEX: 21.6 ML/M2
DOP CALC LVOT STROKE VOLUME: 36
E WAVE DECELERATION TIME: 206 MS
E/A RATIO: 0.9
ERYTHROCYTE [DISTWIDTH] IN BLOOD BY AUTOMATED COUNT: 13 % (ref 11.6–15.1)
FRACTIONAL SHORTENING: 7 (ref 28–44)
GFR SERPL CREATININE-BSD FRML MDRD: 66 ML/MIN/1.73SQ M
GLOBAL LONGITUIDAL STRAIN: -7 %
GLUCOSE SERPL-MCNC: 130 MG/DL (ref 65–140)
HCT VFR BLD AUTO: 44.2 % (ref 34.8–46.1)
HGB BLD-MCNC: 14.7 G/DL (ref 11.5–15.4)
INTERVENTRICULAR SEPTUM IN DIASTOLE (PARASTERNAL SHORT AXIS VIEW): 1.5 CM
INTERVENTRICULAR SEPTUM: 1.5 CM (ref 0.6–1.1)
LA/AORTA RATIO 2D: 1.13
LAAS-AP2: 24.8 CM2
LAAS-AP4: 19.7 CM2
LEFT ATRIUM SIZE: 3.6 CM
LEFT ATRIUM VOLUME (MOD BIPLANE): 74 ML
LEFT ATRIUM VOLUME INDEX (MOD BIPLANE): 44.3 ML/M2
LEFT INTERNAL DIMENSION IN SYSTOLE: 2.5 CM (ref 2.1–4)
LEFT VENTRICLE DIASTOLIC VOLUME (MOD BIPLANE): 75 ML
LEFT VENTRICLE DIASTOLIC VOLUME INDEX (MOD BIPLANE): 44.9 ML/M2
LEFT VENTRICLE SYSTOLIC VOLUME (MOD BIPLANE): 49 ML
LEFT VENTRICLE SYSTOLIC VOLUME INDEX (MOD BIPLANE): 29.3 ML/M2
LEFT VENTRICULAR INTERNAL DIMENSION IN DIASTOLE: 2.7 CM (ref 3.5–6)
LEFT VENTRICULAR POSTERIOR WALL IN END DIASTOLE: 1.3 CM
LEFT VENTRICULAR STROKE VOLUME: 4 ML
LV EF BIPLANE MOD: 35 %
LV EF US.2D.A4C+ESTIMATED: 41 %
LVSV (TEICH): 4 ML
MAGNESIUM SERPL-MCNC: 1.9 MG/DL (ref 1.9–2.7)
MCH RBC QN AUTO: 31 PG (ref 26.8–34.3)
MCHC RBC AUTO-ENTMCNC: 33.3 G/DL (ref 31.4–37.4)
MCV RBC AUTO: 93 FL (ref 82–98)
MV E'TISSUE VEL-LAT: 8 CM/S
MV E'TISSUE VEL-SEP: 7 CM/S
MV PEAK A VEL: 0.61 M/S
MV PEAK E VEL: 55 CM/S
PHOSPHATE SERPL-MCNC: 4.2 MG/DL (ref 2.3–4.1)
PLATELET # BLD AUTO: 229 THOUSANDS/UL (ref 149–390)
PMV BLD AUTO: 9.4 FL (ref 8.9–12.7)
POTASSIUM SERPL-SCNC: 4.7 MMOL/L (ref 3.5–5.3)
RA PRESSURE ESTIMATED: 8 MMHG
RBC # BLD AUTO: 4.74 MILLION/UL (ref 3.81–5.12)
RIGHT VENTRICLE ID DIMENSION: 3.8 CM
RV PSP: 34 MMHG
SL CV LV EF: 25
SL CV PED ECHO LEFT VENTRICLE DIASTOLIC VOLUME (MOD BIPLANE) 2D: 27 ML
SL CV PED ECHO LEFT VENTRICLE SYSTOLIC VOLUME (MOD BIPLANE) 2D: 23 ML
SODIUM SERPL-SCNC: 137 MMOL/L (ref 135–147)
TR MAX PG: 26 MMHG
TR PEAK VELOCITY: 2.6 M/S
TRICUSPID ANNULAR PLANE SYSTOLIC EXCURSION: 1.4 CM
TRICUSPID VALVE PEAK REGURGITATION VELOCITY: 2.57 M/S
WBC # BLD AUTO: 9.42 THOUSAND/UL (ref 4.31–10.16)

## 2025-05-22 PROCEDURE — C8929 TTE W OR WO FOL WCON,DOPPLER: HCPCS

## 2025-05-22 PROCEDURE — 80048 BASIC METABOLIC PNL TOTAL CA: CPT | Performed by: FAMILY MEDICINE

## 2025-05-22 PROCEDURE — 83735 ASSAY OF MAGNESIUM: CPT | Performed by: FAMILY MEDICINE

## 2025-05-22 PROCEDURE — 93005 ELECTROCARDIOGRAM TRACING: CPT

## 2025-05-22 PROCEDURE — 99223 1ST HOSP IP/OBS HIGH 75: CPT | Performed by: INTERNAL MEDICINE

## 2025-05-22 PROCEDURE — 93306 TTE W/DOPPLER COMPLETE: CPT | Performed by: INTERNAL MEDICINE

## 2025-05-22 PROCEDURE — 85027 COMPLETE CBC AUTOMATED: CPT | Performed by: FAMILY MEDICINE

## 2025-05-22 PROCEDURE — 84484 ASSAY OF TROPONIN QUANT: CPT | Performed by: PHYSICIAN ASSISTANT

## 2025-05-22 PROCEDURE — 99232 SBSQ HOSP IP/OBS MODERATE 35: CPT | Performed by: INTERNAL MEDICINE

## 2025-05-22 PROCEDURE — 84100 ASSAY OF PHOSPHORUS: CPT | Performed by: FAMILY MEDICINE

## 2025-05-22 RX ORDER — METOPROLOL SUCCINATE 25 MG/1
25 TABLET, EXTENDED RELEASE ORAL ONCE
Status: DISCONTINUED | OUTPATIENT
Start: 2025-05-22 | End: 2025-05-23

## 2025-05-22 RX ORDER — METOPROLOL SUCCINATE 25 MG/1
25 TABLET, EXTENDED RELEASE ORAL DAILY
Status: DISCONTINUED | OUTPATIENT
Start: 2025-05-22 | End: 2025-05-22

## 2025-05-22 RX ADMIN — LEVOTHYROXINE SODIUM 50 MCG: 0.05 TABLET ORAL at 05:15

## 2025-05-22 RX ADMIN — TIMOLOL MALEATE 1 DROP: 5 SOLUTION OPHTHALMIC at 09:45

## 2025-05-22 RX ADMIN — Medication 30 MG: at 09:52

## 2025-05-22 RX ADMIN — BRIMONIDINE TARTRATE 1 DROP: 2 SOLUTION/ DROPS OPHTHALMIC at 09:45

## 2025-05-22 RX ADMIN — BRIMONIDINE TARTRATE 1 DROP: 2 SOLUTION/ DROPS OPHTHALMIC at 18:23

## 2025-05-22 RX ADMIN — PERFLUTREN 2 ML/MIN: 6.52 INJECTION, SUSPENSION INTRAVENOUS at 15:10

## 2025-05-22 RX ADMIN — ATENOLOL 25 MG: 50 TABLET ORAL at 09:46

## 2025-05-22 RX ADMIN — SODIUM CHLORIDE, SODIUM GLUCONATE, SODIUM ACETATE, POTASSIUM CHLORIDE, MAGNESIUM CHLORIDE, SODIUM PHOSPHATE, DIBASIC, AND POTASSIUM PHOSPHATE 100 ML/HR: .53; .5; .37; .037; .03; .012; .00082 INJECTION, SOLUTION INTRAVENOUS at 09:54

## 2025-05-22 RX ADMIN — OMEGA-3 FATTY ACIDS CAP 1000 MG 1000 MG: 1000 CAP at 09:46

## 2025-05-22 RX ADMIN — PRAVASTATIN SODIUM 20 MG: 20 TABLET ORAL at 18:18

## 2025-05-22 RX ADMIN — OXYCODONE HYDROCHLORIDE 5 MG: 5 TABLET ORAL at 22:19

## 2025-05-22 RX ADMIN — RIVAROXABAN 15 MG: 15 TABLET, FILM COATED ORAL at 18:18

## 2025-05-22 RX ADMIN — TIMOLOL MALEATE 1 DROP: 5 SOLUTION OPHTHALMIC at 18:23

## 2025-05-22 RX ADMIN — CALCIUM 500 MG: 500 TABLET ORAL at 09:46

## 2025-05-22 NOTE — ASSESSMENT & PLAN NOTE
"Presenting with flank pain -CT A/P: \"Mild right-sided collecting system dilatation with a possible 1 mm calculus in the mid ureter \"  Pain controlled s/p Dilaudid x 1  Strain all urine  Continue IVF  Pain control with Tylenol and low-dose dose oxycodone-will avoid NSAIDs for now until ACS fully ruled out  "

## 2025-05-22 NOTE — ED PROVIDER NOTES
Time reflects when diagnosis was documented in both MDM as applicable and the Disposition within this note       Time User Action Codes Description Comment    5/21/2025  8:43 PM Nathaniel eKrn [R07.9] Chest pain     5/21/2025  9:05 PM Nathaniel Kern [R79.89] Elevated troponin     5/21/2025  9:05 PM Nathaniel Kern [N20.1] Ureterolithiasis           ED Disposition       ED Disposition   Admit    Condition   Stable    Date/Time   Wed May 21, 2025  9:05 PM    Comment   Case was discussed with [facility] and the patient's admission status was agreed to be Admission Status: inpatient status to the service of Dr. Moody .               Assessment & Plan       Medical Decision Making  84-year-old female presenting with multiple symptoms.  Per chart review, patient with a history of right hip bursitis.  Given chest pain, concern for ACS, myocarditis.  Abdominal pain consider kidney stone, biliary pathology, pancreatitis, obstruction.  Obtain labs, EKG, troponin, CT abdomen pelvis and symptom control as needed.    Troponin mildly elevated.  Will administer aspirin.  EKG with inverted T waves however no prior to compare.  Patient with a history of atrial fibrillation, pacemaker and has been compliant with her Xarelto.  Discussed case with cardiology team.  Will trend EKG and troponin.    Repeat troponin slightly elevated.  Patient remains chest pain-free.  Will hold off on heparin at this time as patient is asymptomatic.  Continue to trend EKG and troponin.  Admit to [facility] telemetry.    Critical Care Time Statement: Upon my evaluation, this patient had a high probability of imminent or life-threatening deterioration due to ACS, which required my direct attention, intervention, and personal management.  I spent a total of 31 minutes directly providing critical care services, including interpretation of complex medical databases, evaluating for the presence of life-threatening injuries or illnesses, management of organ  system failure(s) , complex medical decision making (to support/prevent further life-threatening deterioration)., and interpretation of hemodynamic data. This time is exclusive of procedures, teaching, treating other patients, family meetings, and any prior time recorded by providers other than myself.            Amount and/or Complexity of Data Reviewed  Labs: ordered.  Radiology: ordered.    Risk  OTC drugs.  Prescription drug management.  Decision regarding hospitalization.        ED Course as of 05/21/25 2108   Wed May 21, 2025   1947 Patient reports her chest pain is now resolved but her right hip is in more pain due to being moved to and from the CAT scan       Medications   ondansetron (ZOFRAN) injection 4 mg (4 mg Intravenous Given 5/21/25 1854)   iohexol (OMNIPAQUE) 350 MG/ML injection (MULTI-DOSE) 100 mL (100 mL Intravenous Given 5/21/25 1933)   aspirin chewable tablet 324 mg (324 mg Oral Given 5/21/25 1948)   HYDROmorphone (DILAUDID) injection 0.5 mg (0.5 mg Intravenous Given 5/21/25 1952)       ED Risk Strat Scores                    No data recorded        SBIRT 20yo+      Flowsheet Row Most Recent Value   Initial Alcohol Screen: US AUDIT-C     1. How often do you have a drink containing alcohol? 0 Filed at: 05/21/2025 1852   2. How many drinks containing alcohol do you have on a typical day you are drinking?  0 Filed at: 05/21/2025 1852   3b. FEMALE Any Age, or MALE 65+: How often do you have 4 or more drinks on one occassion? 0 Filed at: 05/21/2025 1852   Audit-C Score 0 Filed at: 05/21/2025 1852   RUSS: How many times in the past year have you...    Used an illegal drug or used a prescription medication for non-medical reasons? Never Filed at: 05/21/2025 1852                            History of Present Illness       Chief Complaint   Patient presents with    Chest Pain     Pt to ER for eval of R hip pain acute onset 1500 hours denies injury. Took some tylenol for it, had a few episodes of vomiting  and persistent nausea since along with R upper chest pain.        Past Medical History[1]   Past Surgical History[2]   Family History[3]   Social History[4]   E-Cigarette/Vaping    E-Cigarette Use Never User       E-Cigarette/Vaping Substances    Nicotine No     THC No     CBD No     Flavoring No     Other No     Unknown No       I have reviewed and agree with the history as documented.     84-year-old female presents for evaluation of right-sided hip/back pain that started earlier today.  Patient reports taking Tylenol with improvement of symptoms.  Shortly after, started with vomiting and diarrhea and then right sided chest pain.  Patient also reports a chronic cough that she has had for the last few months and is unchanged.  Denies fever, shortness of breath.        Review of Systems   Cardiovascular:  Positive for chest pain.   Gastrointestinal:  Positive for abdominal pain, nausea and vomiting.   Musculoskeletal:  Positive for arthralgias.           Objective       ED Triage Vitals   Temperature Pulse Blood Pressure Respirations SpO2 Patient Position - Orthostatic VS   05/21/25 1902 05/21/25 1847 05/21/25 1847 05/21/25 1847 05/21/25 1847 05/21/25 1847   (!) 97.4 °F (36.3 °C) 78 142/97 20 99 % Sitting      Temp Source Heart Rate Source BP Location FiO2 (%) Pain Score    05/21/25 1902 05/21/25 1847 05/21/25 1847 -- 05/21/25 1847    Temporal Monitor Left arm  8      Vitals      Date and Time Temp Pulse SpO2 Resp BP Pain Score FACES Pain Rating User   05/21/25 2000 -- 79 92 % 21 131/76 -- --    05/21/25 1952 -- -- -- -- -- 10 - Worst Possible Pain --    05/21/25 1902 97.4 °F (36.3 °C) -- -- -- -- -- --    05/21/25 1847 -- 78 99 % 20 142/97 8 -- AM            Physical Exam  Vitals and nursing note reviewed.   Constitutional:       Appearance: She is well-developed.   HENT:      Head: Normocephalic and atraumatic.      Right Ear: External ear normal.      Left Ear: External ear normal.      Nose: Nose normal.      Eyes:      General: No scleral icterus.      Cardiovascular:      Rate and Rhythm: Normal rate.   Pulmonary:      Effort: Pulmonary effort is normal. No respiratory distress.      Breath sounds: No wheezing.   Abdominal:      General: There is no distension.      Tenderness: There is abdominal tenderness.      Comments: Abdomen diffusely tender     Musculoskeletal:         General: No deformity. Normal range of motion.      Cervical back: Normal range of motion.     Skin:     Findings: No rash.     Neurological:      General: No focal deficit present.      Mental Status: She is alert and oriented to person, place, and time.     Psychiatric:         Mood and Affect: Mood normal.         Results Reviewed       Procedure Component Value Units Date/Time    Urine Microscopic [[id number]]  (Abnormal) Collected: 05/21/25 2037    Lab Status: Final result Specimen: Urine, Clean Catch Updated: 05/21/25 2052     RBC, UA Innumerable /hpf      WBC, UA 0-1 /hpf      Epithelial Cells Occasional /hpf      Bacteria, UA Moderate /hpf     HS Troponin I 2hr [[id number]]  (Abnormal) Collected: 05/21/25 1957    Lab Status: Final result Specimen: Blood from Arm, Right Updated: 05/21/25 2052     hs TnI 2hr 292 ng/L      Delta 2hr hsTnI 177 ng/L     Narrative:      Verified by repeat analysis.    UA w Reflex to Microscopic w Reflex to Culture [[id number]]  (Abnormal) Collected: 05/21/25 2037    Lab Status: Final result Specimen: Urine, Clean Catch Updated: 05/21/25 2042     Color, UA Yellow     Clarity, UA Cloudy     Specific Gravity, UA 1.020     pH, UA 5.5     Leukocytes, UA Negative     Nitrite, UA Negative     Protein, UA 30 (1+) mg/dl      Glucose, UA Negative mg/dl      Ketones, UA Negative mg/dl      Urobilinogen, UA <2.0 mg/dl      Bilirubin, UA Negative     Occult Blood, UA Large    HS Troponin I 4hr [[id number]]     Lab Status: No result Specimen: Blood     HS Troponin 0hr (reflex protocol) [[id number]]  (Abnormal) Collected:  05/21/25 1854    Lab Status: Final result Specimen: Blood from Arm, Right Updated: 05/21/25 1925     hs TnI 0hr 115 ng/L     CMP [[id number]]  (Abnormal) Collected: 05/21/25 1854    Lab Status: Final result Specimen: Blood from Arm, Right Updated: 05/21/25 1921     Sodium 136 mmol/L      Potassium 4.3 mmol/L      Chloride 102 mmol/L      CO2 24 mmol/L      ANION GAP 10 mmol/L      BUN 20 mg/dL      Creatinine 1.10 mg/dL      Glucose 213 mg/dL      Calcium 9.5 mg/dL      AST 27 U/L      ALT 25 U/L      Alkaline Phosphatase 90 U/L      Total Protein 7.3 g/dL      Albumin 4.3 g/dL      Total Bilirubin 0.58 mg/dL      eGFR 46 ml/min/1.73sq m     Narrative:      National Kid[employer] Foundation guidelines for Chronic Kidney Disease (CKD):     Stage 1 with normal or high GFR (GFR > 90 mL/min/1.73 square meters)    Stage 2 Mild CKD (GFR = 60-89 mL/min/1.73 square meters)    Stage 3A Moderate CKD (GFR = 45-59 mL/min/1.73 square meters)    Stage 3B Moderate CKD (GFR = 30-44 mL/min/1.73 square meters)    Stage 4 Severe CKD (GFR = 15-29 mL/min/1.73 square meters)    Stage 5 End Stage CKD (GFR <15 mL/min/1.73 square meters)  Note: GFR calculation is accurate only with a steady state creatinine    Lipase [[id number]]  (Normal) Collected: 05/21/25 1854    Lab Status: Final result Specimen: Blood from Arm, Right Updated: 05/21/25 1921     Lipase 55 u/L     Protime-INR [[id number]]  (Abnormal) Collected: 05/21/25 1854    Lab Status: Final result Specimen: Blood from Arm, Right Updated: 05/21/25 1917     Protime 18.0 seconds      INR 1.44    Narrative:      INR Therapeutic Range    Indication                                             INR Range      Atrial Fibrillation                                               2.0-3.0  Hypercoagulable State                                    2.0.2.3  Left Ventricular Asist Device                            2.0-3.0  Mechanical Heart Valve                                  -    Aortic(with afib,  MI, embolism, HF, LA enlargement,    and/or coagulopathy)                                     2.0-3.0 (2.5-3.5)     Mitral                                                             2.5-3.5  Prosthetic/Bioprosthetic Heart Valve               2.0-3.0  Venous thromboembolism (VTE: VT, PE        2.0-3.0    APTT [[id number]]  (Normal) Collected: 05/21/25 1854    Lab Status: Final result Specimen: Blood from Arm, Right Updated: 05/21/25 1917     PTT 28 seconds     CBC and differential [[id number]]  (Abnormal) Collected: 05/21/25 1854    Lab Status: Final result Specimen: Blood from Arm, Right Updated: 05/21/25 1903     WBC 11.21 Thousand/uL      RBC 4.88 Million/uL      Hemoglobin 14.8 g/dL      Hematocrit 46.9 %      MCV 96 fL      MCH 30.3 pg      MCHC 31.6 g/dL      RDW 12.5 %      MPV 9.2 fL      Platelets 261 Thousands/uL      nRBC 0 /100 WBCs      Segmented % 71 %      Immature Grans % 0 %      Lymphocytes % 22 %      Monocytes % 6 %      Eosinophils Relative 1 %      Basophils Relative 0 %      Absolute Neutrophils 7.90 Thousands/µL      Absolute Immature Grans 0.05 Thousand/uL      Absolute Lymphocytes 2.51 Thousands/µL      Absolute Monocytes 0.62 Thousand/µL      Eosinophils Absolute 0.10 Thousand/µL      Basophils Absolute 0.03 Thousands/µL             CT abdomen pelvis with contrast   Final Interpretation by Ingrid Ruvalcaba MD (05/21 2016)      Mild right-sided collecting system dilatation with a possible 1 mm calculus in the mid ureter.   There is a prominent vessel coursing immediately alongside the right ureter, and this punctate focus could be vascular but appears to be separate. Recommend correlation with any symptoms of right-sided renal colic.      Diverticulosis. No evidence of acute diverticulitis.      The study was marked in EPIC for immediate notification.               Workstation performed: [id number]         X-ray chest 1 view portable   Final Interpretation by Kane Aguilar MD (05/21  2002)      No acute cardiopulmonary disease.   Right ventricular lead is redundant within the right ventricle. This appearance is unchanged dating back to at least 11/5/2015. Recommend electrophysiology follow-up.      The study was marked in EPIC for immediate notification.      Workstation performed: [id number]         XR hip/pelv 2-3 vws right if performed   Final Interpretation by Kane Aguilar MD (05/21 1959)      No acute osseous abnormality.         Computerized Assisted Algorithm (CAA) may have been used to analyze all applicable images.            Workstation performed: [id number]             Procedures    ED Medication and Procedure Management   Prior to Admission Medications   Prescriptions Last Dose Informant Patient Reported? Taking?   COMBIGAN 0.2-0.5 %  Self Yes No   Coenzyme Q10 (Co Q 10) 10 MG CAPS  Self Yes No   Sig: Take by mouth   Multiple Vitamins-Minerals (MULTIVITAMIN ADULT PO)  Self Yes No   Sig: Take by mouth   Omega-3 Fatty Acids (Fish Oil Concentrate) 300 MG CAPS  Self Yes No   Sig: Take by mouth   Xarelto 20 MG tablet  Self Yes No   Sig: TAKE 1 TABLET BY MOUTH AT BEDTIME WITH EVENING MEAL   atenolol (TENORMIN) 25 mg tablet  Self Yes No   levothyroxine 50 mcg tablet  Self Yes No   methylPREDNISolone 4 MG tablet therapy pack   No No   Sig: Use as directed on package   simvastatin (ZOCOR) 10 mg tablet  Self Yes No      Facility-Administered Medications: None     Patient's Medications   Discharge Prescriptions    No medications on file     No discharge procedures on file.  ED SEPSIS DOCUMENTATION   Time reflects when diagnosis was documented in both MDM as applicable and the Disposition within this note       Time User Action Codes Description Comment    5/21/2025  8:43 PM Nathaniel Kern [R07.9] Chest pain     5/21/2025  9:05 PM Nathaniel Kern [R79.89] Elevated troponin     5/21/2025  9:05 PM Nathaniel Kern [N20.1] Ureterolithiasis                    [1]   Past Medical  History:  Diagnosis Date    Atrial fibrillation (HCC)     Dr Escobar    Cancer (HCC)     colon, resolved after resection    Chronic anticoagulation     Colon cancer (HCC)     History of colon cancer s/p sigmoid resection 2011    Hyperlipidemia     Hypertension     pt states resolved    Thyroid disease    [2]   Past Surgical History:  Procedure Laterality Date    ATRIAL CARDIAC PACEMAKER INSERTION  2006 and 2014    COLON SIGMOID RESECTION  2011   [3]   Family History  Problem Relation Name Age of Onset    Colon cancer Brother     [4]   Social History  Tobacco Use    Smoking status: Never    Smokeless tobacco: Never   Vaping Use    Vaping status: Never Used   Substance Use Topics    Alcohol use: Yes    Drug use: Never        Nathaniel Kern DO  05/21/25 8606

## 2025-05-22 NOTE — ASSESSMENT & PLAN NOTE
Laura reports that she had sudden onset right flank pain yesterday afternoon after lunch.  Pain became progressively worse prompting her to seek evaluation in the emergency department.  On her her way to the emergency department she describes epigastric chest discomfort radiating to her bilateral chest wall associated with nausea with vomiting and flank pain.  This resolved in the emergency department after pain medication administered.  She has no current chest pain at this time.    Echo: Pending  CXR: No evidence of pulmonary edema, effusion  Troponin :elevated with peak of 480 - now trending down

## 2025-05-22 NOTE — ASSESSMENT & PLAN NOTE
Follows with cardiology at [facility]  RC: Atenolol 25 Mg twice daily  AC: Xarelto 20 Mg daily  Continue home atenolol and xarelto for now - if trop becomes > 1000 will hold Xarelto and place on heparin gtt.  Xarelto decreased to 15 Mg daily due to creatinine clearance

## 2025-05-22 NOTE — CONSULTS
Consultation - Cardiology   Name: Laura Fitzgerald 84 y.o. female I MRN: [id number]  Unit/Bed#: -01 I Date of Admission: 5/21/2025   Date of Service: 5/22/2025 I Hospital Day: 1   Inpatient consult to Cardiology  Consult performed by: ELIZABETH Rodriguez  Consult ordered by: Kristyn Garcia PA-C        Physician Requesting Evaluation: Alicja Perez MD   Reason for Evaluation / Principal Problem: Chest pain, elevated troponin    Assessment & Plan  Chest pain  Laura reports that she had sudden onset right flank pain yesterday afternoon after lunch.  Pain became progressively worse prompting her to seek evaluation in the emergency department.  On her her way to the emergency department she describes epigastric chest discomfort radiating to her bilateral chest wall associated with nausea with vomiting and flank pain.  This resolved in the emergency department after pain medication administered.  She has no current chest pain at this time.    Echo: Pending  CXR: No evidence of pulmonary edema, effusion  Troponin :elevated with peak of 480 - now trending down   Atrial fibrillation (HCC)  Patient with a history of atrial fibrillation -follows with Dr. Escobar from [facility]  Anticoagulated with Xarelto for stroke prevention, atenolol 25 mg twice daily for rate control.  Patient reports compliance with both these medications  Dual chamber PPM in place   Plan  Echocardiogram today -will make further recommendations after this has been completed  Continue telemetry  Continue Xarelto    History of Present Illness   Larua Fitzgerald is a 84 y.o. female with a past medical history of sick sinus syndrome with pacemaker implantation, lumbar disc herniation, colon cancer with a sigmoid resection in 2011 who presents to [facility] emergency department on 5/21/2025 with complaints of right hip/flank pain, chest pain.  Patient reports that chest pain started in the epigastric region on  "her way to the hospital and was associated with nausea and vomiting along with a radiation to her bilateral chest wall.  CT scan revealed mild right sided collecting system dilatation with a possible 1 mm calculus in the mid ureter.  EKG revealed V paced rhythm with underlying atrial fibrillation.  On arrival patient had elevated troponins prompting I will cardiovascular consultation.    Patient follows with [place] cardiology, Dr. Escobar.  She does report that she previously had a stress test but this was \" years\" ago per patient with no specific findings that she can remember.  She tells me that she has had a pacemaker implanted many years ago, with a generator change more recently.  She denies any personal history of coronary artery disease or chest pain previously.  She is an active person who gardens and walks approximately 2 miles daily with friends.  Denies any shortness of breath, fatigue, chest pain, chest pressure, lightheadedness, dizziness with any of these activities.      Laura does report a chronic cough which she states is worse at night prompting her to sleep elevated.  She does have rhonchi in her right base.  No edema noted.        Review of Systems   Constitutional:  Negative for fatigue and fever.   HENT:  Positive for postnasal drip and rhinorrhea.    Respiratory:  Positive for cough. Negative for chest tightness and shortness of breath.    Cardiovascular:  Negative for chest pain, palpitations and leg swelling.   Gastrointestinal:  Negative for anal bleeding, blood in stool, diarrhea and nausea.   Genitourinary:  Negative for hematuria.   Neurological:  Negative for dizziness, syncope, weakness, light-headedness and numbness.     Medical History Review: I have reviewed the patient's PMH, PSH, Social History, Family History, Meds, and Allergies     Objective :  Temp:  [97.2 °F (36.2 °C)-98 °F (36.7 °C)] 97.2 °F (36.2 °C)  HR:  [47-85] 65  BP: (105-142)/(57-97) 108/82  Resp:  [15-22] " 19  SpO2:  [92 %-99 %] 95 %  O2 Device: None (Room air)  Orthostatic Blood Pressures      Flowsheet Row Most Recent Value   Blood Pressure 108/82 filed at 05/22/2025 0741   Patient Position - Orthostatic VS Lying filed at 05/22/2025 0415          First Weight: Weight - Scale: 64 kg (141 lb) (05/21/25 1847)  Vitals:    05/21/25 1847 05/21/25 2208   Weight: 64 kg (141 lb) 64 kg (141 lb)       Physical Exam  Constitutional:       Appearance: Normal appearance.   HENT:      Head: Normocephalic and atraumatic.      Nose: Nose normal.      Mouth/Throat:      Mouth: Mucous membranes are moist.     Cardiovascular:      Rate and Rhythm: Normal rate and regular rhythm.      Pulses: Normal pulses.      Heart sounds: Normal heart sounds.   Pulmonary:      Effort: Pulmonary effort is normal.      Breath sounds: Rhonchi present.   Abdominal:      Palpations: Abdomen is soft.     Musculoskeletal:         General: Normal range of motion.      Cervical back: Normal range of motion.      Right lower leg: No edema.     Skin:     General: Skin is warm.      Capillary Refill: Capillary refill takes less than 2 seconds.     Neurological:      General: No focal deficit present.      Mental Status: She is alert and oriented to person, place, and time. Mental status is at baseline.         Lab Results: I have reviewed the following results:Troponin,BNP:  .     05/21/25 1957 05/22/25  0033   HSTNI0  --  464*   HSTNI2 292*  --       Results from last 7 days   Lab Units 05/22/25 0413 05/21/25  1854   WBC Thousand/uL 9.42 11.21*   HEMOGLOBIN g/dL 14.7 14.8   HEMATOCRIT % 44.2 46.9*   PLATELETS Thousands/uL 229 261     Results from last 7 days   Lab Units 05/22/25  0413 05/21/25  1854   POTASSIUM mmol/L 4.7 4.3   CHLORIDE mmol/L 105 102   CO2 mmol/L 21 24   BUN mg/dL 19 20   CREATININE mg/dL 0.82 1.10   CALCIUM mg/dL 9.1 9.5     Results from last 7 days   Lab Units 05/21/25  1854   INR  1.44*   PTT seconds 28     Lab Results   Component Value  Date    HGBA1C 5.6 05/19/2020     Lab Results   Component Value Date    CKTOTAL 106 08/09/2014       Imaging Results Review: I reviewed radiology reports from this admission including: chest xray and CT abdomen/pelvis.  Other Study Results Review: EKG was reviewed.     VTE Prophylaxis: VTE covered by:  rivaroxaban, Oral, 15 mg at 05/21/25 5589

## 2025-05-22 NOTE — H&P
"H&P - Hospitalist   Name: Laura Fitzgerald 84 y.o. female I MRN: [id number]  Unit/Bed#: -01 I Date of Admission: 5/21/2025   Date of Service: 5/21/2025 I Hospital Day: 0     Assessment & Plan  Chest pain  Presenting to the ED with right-sided chest pain that onset after vomiting and diarrhea -pain resolved prior to analgesia in the ED  HS troponin: 115/292/480  EKG does show T wave inversions inferolaterally-no prior for comparison other than EKG impression from March 2025 stating paced rhythm  Cardiology felt no indication for transfer or heparin GTT at this time  Trend troponin until peak-if troponin increases significantly (>1000) would initiate heparin gtt. and hold home Xarelto  Obtain echo  cardiology consulted  Remains pain free on admission   Ureterolithiasis  Presenting with flank pain -CT A/P: \"Mild right-sided collecting system dilatation with a possible 1 mm calculus in the mid ureter \"  Pain controlled s/p Dilaudid x 1  Strain all urine  Continue IVF  Pain control with Tylenol and low-dose dose oxycodone-will avoid NSAIDs for now until ACS fully ruled out  Atrial fibrillation (HCC)  Follows with cardiology at [facility]  RC: Atenolol 25 Mg twice daily  AC: Xarelto 20 Mg daily  Continue home atenolol and xarelto for now - if trop becomes > 1000 will hold Xarelto and place on heparin gtt.  Xarelto decreased to 15 Mg daily due to creatinine clearance  Hypothyroidism  Continue home Synthroid 50 mcg daily      VTE Pharmacologic Prophylaxis: VTE Score: 3 Moderate Risk (Score 3-4) - Pharmacological DVT Prophylaxis Ordered: rivaroxaban (Xarelto).  Code Status: Level 3 - DNAR and DNI   Discussion with family: Patient declined call to .     Anticipated Length of Stay: Patient will be admitted on an inpatient basis with an anticipated length of stay of greater than 2 midnights secondary to chest pain with elevated troponins, ureterolithiasis.    History of Present Illness   Chief " "Complaint: \" Right side pain\"    Laura Fitzgerald is a 84 y.o. female with a PMH of A-fib on Xarelto and hypothyroidism who presents with sudden onset right flank pain at 1515.  Patient reports pain onset at 1515 was rated 9/10 in severity.  She then developed nausea and vomiting shortly afterwards.  At 1730 she did develop bilateral central chest pain without associated diaphoresis or dyspnea.  She did have ongoing nausea at the time.  Reports chest pain resolved prior to analgesics in the ED.  Flank pain resolved after receiving low-dose Dilaudid in the ED.  On admission she is pain-free.  Denies any other acute symptoms.    Review of Systems   Constitutional:  Negative for chills and fever.   HENT:  Negative for congestion.    Respiratory:  Negative for cough and shortness of breath.    Cardiovascular:  Positive for chest pain. Negative for leg swelling.   Gastrointestinal:  Positive for nausea and vomiting. Negative for constipation and diarrhea.   Genitourinary:  Positive for flank pain. Negative for difficulty urinating.   Musculoskeletal:  Negative for gait problem.   Neurological:  Negative for weakness and numbness.   All other systems reviewed and are negative.      Historical Information   Past Medical History[1]  Past Surgical History[2]  Social History[3]  E-Cigarette/Vaping    E-Cigarette Use Never User      E-Cigarette/Vaping Substances    Nicotine No     THC No     CBD No     Flavoring No     Other No     Unknown No      Family History[4]  Social History:  Marital Status: /Civil Union   Occupation: Retired  Patient Pre-hospital Living Situation: Home, Alone  Patient Pre-hospital Level of Mobility: walks  Patient Pre-hospital Diet Restrictions: None    Meds/Allergies   I have reviewed home medications with patient personally.  Prior to Admission medications    Medication Sig Start Date End Date Taking? Authorizing Provider   atenolol (TENORMIN) 25 mg tablet Take by mouth 2 (two) times a day " 3/12/18  Yes Historical Provider, MD   calcium carbonate (OS-BEL) 1250 (500 Ca) MG tablet Take 1 tablet by mouth daily   Yes Historical Provider, MD   Coenzyme Q10 (Co Q 10) 10 MG CAPS Take by mouth   Yes Historical Provider, MD   COMBIGAN 0.2-0.5 % Administer 1 drop to both eyes every 12 (twelve) hours 2/12/18  Yes Historical Provider, MD   levothyroxine 50 mcg tablet  1/10/18  Yes Historical Provider, MD   Multiple Vitamins-Minerals (MULTIVITAMIN ADULT PO) Take by mouth   Yes Historical Provider, MD   Omega-3 Fatty Acids (Fish Oil Concentrate) 300 MG CAPS Take by mouth   Yes Historical Provider, MD   Rhopressa 0.02 % SOLN Administer 1 drop to both eyes daily at bedtime 4/24/25  Yes Historical Provider, MD   simvastatin (ZOCOR) 10 mg tablet  1/10/18  Yes Historical Provider, MD   Xarelto 20 MG tablet  1/31/23  Yes Historical Provider, MD   methylPREDNISolone 4 MG tablet therapy pack Use as directed on package  Patient not taking: Reported on 5/21/2025 2/22/23 5/21/25  Yuan Streeter,      Allergies   Allergen Reactions    Carvedilol Cough    Metoprolol Cough       Objective :  Temp:  [97.4 °F (36.3 °C)-98 °F (36.7 °C)] 98 °F (36.7 °C)  HR:  [78-85] 85  BP: (105-142)/(57-97) 114/73  Resp:  [15-22] 15  SpO2:  [92 %-99 %] 94 %  O2 Device: None (Room air)    Physical Exam  Vitals and nursing note reviewed.   Constitutional:       General: She is not in acute distress.     Appearance: Normal appearance. She is not ill-appearing.      Comments: Pleasant and conversational   HENT:      Head: Normocephalic.      Nose: Nose normal.      Mouth/Throat:      Mouth: Mucous membranes are moist.     Eyes:      Conjunctiva/sclera: Conjunctivae normal.       Cardiovascular:      Rate and Rhythm: Normal rate and regular rhythm.      Pulses: Normal pulses.      Heart sounds: No murmur heard.  Pulmonary:      Effort: Pulmonary effort is normal. No respiratory distress.      Breath sounds: Normal breath sounds. No wheezing, rhonchi or  rales.   Abdominal:      General: Abdomen is flat.      Palpations: Abdomen is soft.      Tenderness: There is no abdominal tenderness. There is no right CVA tenderness or left CVA tenderness.     Musculoskeletal:         General: Normal range of motion.      Cervical back: Normal range of motion.      Right lower leg: No edema.      Left lower leg: No edema.     Skin:     General: Skin is warm and dry.      Coloration: Skin is not pale.     Neurological:      General: No focal deficit present.      Mental Status: She is alert and oriented to person, place, and time.     Psychiatric:         Mood and Affect: Mood normal.         Thought Content: Thought content normal.          Lines/Drains:            Lab Results: I have reviewed the following results:  Results from last 7 days   Lab Units 05/21/25  1854   WBC Thousand/uL 11.21*   HEMOGLOBIN g/dL 14.8   HEMATOCRIT % 46.9*   PLATELETS Thousands/uL 261   SEGS PCT % 71   LYMPHO PCT % 22   MONO PCT % 6   EOS PCT % 1     Results from last 7 days   Lab Units 05/21/25  1854   SODIUM mmol/L 136   POTASSIUM mmol/L 4.3   CHLORIDE mmol/L 102   CO2 mmol/L 24   BUN mg/dL 20   CREATININE mg/dL 1.10   ANION GAP mmol/L 10   CALCIUM mg/dL 9.5   ALBUMIN g/dL 4.3   TOTAL BILIRUBIN mg/dL 0.58   ALK PHOS U/L 90   ALT U/L 25   AST U/L 27   GLUCOSE RANDOM mg/dL 213*     Results from last 7 days   Lab Units 05/21/25  1854   INR  1.44*         Lab Results   Component Value Date    HGBA1C 5.6 05/19/2020           Imaging Results Review: I reviewed radiology reports from this admission including: chest xray, CT abdomen/pelvis, and xray(s).  Other Study Results Review: EKG was personally reviewed and my interpretation is: Sinus with T wave inversions inferolaterally.  No prior EKG for comparison.  Last EKG showing prolonged QTc...    Administrative Statements       ** Please Note: This note has been constructed using a voice recognition system. **         [1]   Past Medical  History:  Diagnosis Date    Atrial fibrillation (HCC)     Dr Escobar    Cancer (HCC)     colon, resolved after resection    Chronic anticoagulation     Colon cancer (HCC)     History of colon cancer s/p sigmoid resection 2011    Hyperlipidemia     Hypertension     pt states resolved    Thyroid disease    [2]   Past Surgical History:  Procedure Laterality Date    ATRIAL CARDIAC PACEMAKER INSERTION  2006 and 2014    COLON SIGMOID RESECTION  2011   [3]   Social History  Tobacco Use    Smoking status: Never    Smokeless tobacco: Never   Vaping Use    Vaping status: Never Used   Substance and Sexual Activity    Alcohol use: Yes    Drug use: Never   [4]   Family History  Problem Relation Name Age of Onset    Colon cancer Brother

## 2025-05-22 NOTE — PLAN OF CARE
Problem: Potential for Falls  Goal: Patient will remain free of falls  Description: INTERVENTIONS:  - Educate patient/family on patient safety including physical limitations  - Instruct patient to call for assistance with activity   - Consider consulting OT/PT to assist with strengthening/mobility based on AM PAC & JH-HLM score  - Consult OT/PT to assist with strengthening/mobility   - Keep Call bell within reach  - Keep bed low and locked with side rails adjusted as appropriate  - Keep care items and personal belongings within reach  - Initiate and maintain comfort rounds  - Make Fall Risk Sign visible to staff  - Offer Toileting every  Hours, in advance of need  - Initiate/Maintain alarm  - Obtain necessary fall risk management equipment:   - Apply yellow socks and bracelet for high fall risk patients  - Consider moving patient to room near nurses station  Outcome: Progressing     Problem: CARDIOVASCULAR - ADULT  Goal: Maintains optimal cardiac output and hemodynamic stability  Description: INTERVENTIONS:  - Monitor I/O, vital signs and rhythm  - Monitor for S/S and trends of decreased cardiac output  - Administer and titrate ordered vasoactive medications to optimize hemodynamic stability  - Assess quality of pulses, skin color and temperature  - Assess for signs of decreased coronary artery perfusion  - Instruct patient to report change in severity of symptoms  Outcome: Progressing  Goal: Absence of cardiac dysrhythmias or at baseline rhythm  Description: INTERVENTIONS:  - Continuous cardiac monitoring, vital signs, obtain 12 lead EKG if ordered  - Administer antiarrhythmic and heart rate control medications as ordered  - Monitor electrolytes and administer replacement therapy as ordered  Outcome: Progressing

## 2025-05-22 NOTE — PROGRESS NOTES
"Progress Note - Hospitalist   Name: Laura Fitzgerald 84 y.o. female I MRN: [id number]  Unit/Bed#: -01 I Date of Admission: 5/21/2025   Date of Service: 5/22/2025 I Hospital Day: 1    Assessment & Plan  Chest pain  Presenting to the ED with right-sided chest pain that onset after vomiting and diarrhea -pain resolved prior to analgesia in the ED  HS troponin: 115/292/480/464  Cardiology consulted, appreciate recommendations  Patient without any pain  Echo is pending.  Ureterolithiasis  Presenting with flank pain -CT A/P: \"Mild right-sided collecting system dilatation with a possible 1 mm calculus in the mid ureter \"  Pain controlled s/p Dilaudid x 1  Strain all urine  Patient without any pain currently.  Atrial fibrillation (HCC)  Follows with cardiology at [facility]  RC: Atenolol 25 Mg twice daily  AC: Xarelto 20 Mg daily  Continue home atenolol and xarelto for now - if trop becomes > 1000 will hold Xarelto and place on heparin gtt.  Xarelto decreased to 15 Mg daily due to creatinine clearance  Hypothyroidism  Continue home Synthroid 50 mcg daily    VTE Pharmacologic Prophylaxis: VTE Score: 3 Moderate Risk (Score 3-4) - Pharmacological DVT Prophylaxis Ordered: rivaroxaban (Xarelto).    Mobility:   Basic Mobility Inpatient Raw Score: 24  JH-HLM Goal: 8: Walk 250 feet or more  JH-HLM Achieved: 6: Walk 10 steps or more  JH-HLM Goal achieved. Continue to encourage appropriate mobility.    Patient Centered Rounds: I performed bedside rounds with nursing staff today.   Discussions with Specialists or Other Care Team Provider: obinna,Leticia Toney     Education and Discussions with Family / Patient: Patient declined call to .     Current Length of Stay: 1 day(s)  Current Patient Status: Inpatient   Certification Statement: The patient will continue to require additional inpatient hospital stay due to pending echo , elevated troponins   Discharge Plan: Anticipate discharge tomorrow to home.    Code " Status: Level 3 - DNAR and DNI    Subjective     Patient is feeling well.  Currently she does not have any pain.  No nausea no vomiting.    Objective :  Temp:  [97.2 °F (36.2 °C)-98 °F (36.7 °C)] 97.2 °F (36.2 °C)  HR:  [47-85] 67  BP: (105-142)/(57-97) 111/74  Resp:  [15-22] 19  SpO2:  [92 %-99 %] 96 %  O2 Device: None (Room air)    Body mass index is 24.99 kg/m².     Input and Output Summary (last 24 hours):     Intake/Output Summary (Last 24 hours) at 5/22/2025 1519  Last data filed at 5/22/2025 0954  Gross per 24 hour   Intake 1531.67 ml   Output 200 ml   Net 1331.67 ml       Physical Exam  Vitals and nursing note reviewed.   Constitutional:       General: She is not in acute distress.     Appearance: She is not diaphoretic.   HENT:      Head: Normocephalic.     Eyes:      General:         Right eye: No discharge.         Left eye: No discharge.       Cardiovascular:      Rate and Rhythm: Normal rate and regular rhythm.   Pulmonary:      Effort: Pulmonary effort is normal. No respiratory distress.      Breath sounds: Normal breath sounds. No wheezing, rhonchi or rales.   Abdominal:      General: There is no distension.      Palpations: Abdomen is soft.      Tenderness: There is no abdominal tenderness. There is no guarding or rebound.     Musculoskeletal:      Cervical back: Normal range of motion.      Right lower leg: No edema.      Left lower leg: No edema.     Skin:     General: Skin is warm.     Neurological:      Mental Status: She is alert and oriented to person, place, and time.     Psychiatric:         Mood and Affect: Mood normal.         Behavior: Behavior normal.         Thought Content: Thought content normal.         Judgment: Judgment normal.           Lines/Drains:        Telemetry:  Telemetry Orders (From admission, onward)               24 Hour Telemetry Monitoring  Continuous x 24 Hours (Telem)        Question:  Reason for 24 Hour Telemetry  Answer:  PCI/EP study (including pacer and ICD  implementation), Cardiac surgery, MI, abnormal cardiac cath, and chest pain- rule out MI                     Telemetry Reviewed: Normal Sinus Rhythm  Indication for Continued Telemetry Use: No indication for continued use. Will discontinue.                Lab Results: I have reviewed the following results:   Results from last 7 days   Lab Units 05/22/25  0413 05/21/25  1854   WBC Thousand/uL 9.42 11.21*   HEMOGLOBIN g/dL 14.7 14.8   HEMATOCRIT % 44.2 46.9*   PLATELETS Thousands/uL 229 261   SEGS PCT %  --  71   LYMPHO PCT %  --  22   MONO PCT %  --  6   EOS PCT %  --  1     Results from last 7 days   Lab Units 05/22/25  0413 05/21/25  1854   SODIUM mmol/L 137 136   POTASSIUM mmol/L 4.7 4.3   CHLORIDE mmol/L 105 102   CO2 mmol/L 21 24   BUN mg/dL 19 20   CREATININE mg/dL 0.82 1.10   ANION GAP mmol/L 11 10   CALCIUM mg/dL 9.1 9.5   ALBUMIN g/dL  --  4.3   TOTAL BILIRUBIN mg/dL  --  0.58   ALK PHOS U/L  --  90   ALT U/L  --  25   AST U/L  --  27   GLUCOSE RANDOM mg/dL 130 213*     Results from last 7 days   Lab Units 05/21/25  1854   INR  1.44*                   Recent Cultures (last 7 days):         Imaging Results Review: I reviewed radiology reports from this admission including: CT abdomen/pelvis.  Other Study Results Review: No additional pertinent studies reviewed.    Last 24 Hours Medication List:     Current Facility-Administered Medications:     acetaminophen (TYLENOL) tablet 650 mg, Q6H PRN    aluminum-magnesium hydroxide-simethicone (MAALOX) oral suspension 30 mL, Q6H PRN    atenolol (TENORMIN) tablet 25 mg, BID    brimonidine tartrate 0.2 % ophthalmic solution 1 drop, BID **AND** timolol (TIMOPTIC) 0.5 % ophthalmic solution 1 drop, BID    calcium carbonate (OYSTER SHELL,OSCAL) 500 mg tablet 500 mg, Daily    co-enzyme Q-10 capsule 30 mg, Daily    fish oil capsule 1,000 mg, Daily    levothyroxine tablet 50 mcg, Early Morning    Netarsudil Dimesylate 0.02 % SOLN 1 drop, HS    oxyCODONE (ROXICODONE) split  tablet 2.5 mg, Q6H PRN **OR** oxyCODONE (ROXICODONE) IR tablet 5 mg, Q6H PRN    pravastatin (PRAVACHOL) tablet 20 mg, Daily With Dinner    rivaroxaban (XARELTO) tablet 15 mg, Daily With Dinner    senna (SENOKOT) tablet 8.6 mg, HS PRN    Administrative Statements   Today, Patient Was Seen By: Alicja Perez MD      **Please Note: This note may have been constructed using a voice recognition system.**

## 2025-05-22 NOTE — ASSESSMENT & PLAN NOTE
Patient with a history of atrial fibrillation -follows with Dr. Escobar from [facility]  Anticoagulated with Xarelto for stroke prevention, atenolol 25 mg twice daily for rate control.  Patient reports compliance with both these medications  Dual chamber PPM in place

## 2025-05-22 NOTE — PLAN OF CARE
Problem: Potential for Falls  Goal: Patient will remain free of falls  Description: INTERVENTIONS:  - Educate patient/family on patient safety including physical limitations  - Instruct patient to call for assistance with activity   - Consider consulting OT/PT to assist with strengthening/mobility based on AM PAC & JH-HLM score  - Consult OT/PT to assist with strengthening/mobility   - Keep Call bell within reach  - Keep bed low and locked with side rails adjusted as appropriate  - Keep care items and personal belongings within reach  - Initiate and maintain comfort rounds  - Make Fall Risk Sign visible to staff  - Offer Toileting every 2 Hours, in advance of need  - Initiate/Maintain bed alarm  - Obtain necessary fall risk management equipment: call light  - Apply yellow socks and bracelet for high fall risk patients  - Consider moving patient to room near nurses station  Outcome: Progressing

## 2025-05-22 NOTE — ASSESSMENT & PLAN NOTE
"Presenting with flank pain -CT A/P: \"Mild right-sided collecting system dilatation with a possible 1 mm calculus in the mid ureter \"  Pain controlled s/p Dilaudid x 1  Strain all urine  Patient without any pain currently.  "

## 2025-05-22 NOTE — CASE MANAGEMENT
Case Management Assessment & Discharge Planning Note    Patient name Laura Fitzgerald  Location /-01 MRN [id number]  : 1940 Date 2025       Current Admission Date: 2025  Current Admission Diagnosis:Chest pain   Patient Active Problem List    Diagnosis Date Noted    Ureterolithiasis 2025    Chest pain 2025    Hypothyroidism 2025    Chronic bilateral low back pain without sciatica 2022    Lumbar disc herniation 2022    Lumbar spondylosis 2022    Acute right hip pain 2022    Greater trochanteric bursitis of right hip 2022    Chronic cough 2021    Atrial fibrillation (HCC)     Chronic anticoagulation     History of colon cancer       LOS (days): 1  Geometric Mean LOS (GMLOS) (days): 2.1  Days to GMLOS:1.4     OBJECTIVE:    Risk of Unplanned Readmission Score: 11.72         Current admission status: Inpatient       Preferred Pharmacy:   [facility] [address] [place] [id number]  Phone: [phone] Fax: [phone]    Primary Care Provider: Carmita Alvarado DO    Primary Insurance: MEDICARE  Secondary Insurance: AETNA    ASSESSMENT:  Active Health Care Proxies       Philippe Fitzgerald Health Care Representative - Son   Primary Phone: [phone] (Mobile)                 Advance Directives  Does patient have a Health Care POA?: Yes  Does patient have Advance Directives?: Yes  Advance Directives: Living will, Power of  for health care  Primary Contact: Hunter ([facility])         Readmission Root Cause  30 Day Readmission: No    Patient Information  Admitted from:: Home  Mental Status: Alert  During Assessment patient was accompanied by: Not accompanied during assessment  Assessment information provided by:: Patient  Primary Caregiver: Self  Support Systems: Self, Son, Family members  Home entry access options. Select all that apply.: Stairs  Number of  steps to enter home.: 4  Type of Current Residence: 2 story home  Upon entering residence, is there a bedroom on the main floor (no further steps)?: No  A bedroom is located on the following floor levels of residence (select all that apply):: 2nd Floor  Upon entering residence, is there a bathroom on the main floor (no further steps)?: No  Indicate which floors of current residence have a bathroom (select all the apply):: 2nd Floor  Number of steps to 2nd floor from main floor: One Flight  Living Arrangements: Lives Alone    Activities of Daily Living Prior to Admission  Functional Status: Independent  Completes ADLs independently?: Yes  Ambulates independently?: Yes  Does patient use assisted devices?: No  Does patient currently own DME?: No  Does patient have a history of Outpatient Therapy (PT/OT)?: No  Does the patient have a history of Short-Term Rehab?: No  Does patient have a history of HHC?: No  Does patient currently have HHC?: No         Patient Information Continued  Does patient have prescription coverage?: Yes  Can the patient afford their medications and any related supplies (such as glucometers or test strips)?: Yes  Does patient receive dialysis treatments?: No  Does patient have a history of substance abuse?: No  Does patient have a history of Mental Health Diagnosis?: No    Means of Transportation  Means of Transport to Appts:: Drives Self    DISCHARGE DETAILS:    Discharge planning discussed with:: patient  Freedom of Choice: Yes  Comments - Freedom of Choice: no anticipated dc needs  CM contacted family/caregiver?: No- see comments (reports being in contact with family)  Were Treatment Team discharge recommendations reviewed with patient/caregiver?: Yes  Did patient/caregiver verbalize understanding of patient care needs?: Yes  Were patient/caregiver advised of the risks associated with not following Treatment Team discharge recommendations?: Yes    Requested Home Health Care         Is the  patient interested in HHC at discharge?: No    DME Referral Provided  Referral made for DME?: No    Treatment Team Recommendation: Home  Discharge Destination Plan:: Home  Transport at Discharge : Family     Additional Comments: Met with pt to discuss the role of CM and to discuss any help pt  may need prior to dc. Pt lives alone in a 2 story home with 4 JANNY. Pt performed ADL's indptly pta, no use of DME. No hx of HHC or rehab. No hx of mental health or D&A treatment. Pt's preferred pharmacy is [facility]. Pt drives. Pt's family will transport home at dc.    Notification made to OP CM Handoff: [facility] OP CM regarding discharge planning and disposition.

## 2025-05-22 NOTE — ASSESSMENT & PLAN NOTE
Presenting to the ED with right-sided chest pain that onset after vomiting and diarrhea -pain resolved prior to analgesia in the ED  HS troponin: 115/292/480/464  Cardiology consulted, appreciate recommendations  Patient without any pain  Echo is pending.

## 2025-05-22 NOTE — ASSESSMENT & PLAN NOTE
Follows with cardiology at [facility]  RC: Atenolol 25 Mg twice daily  AC: Xarelto 20 Mg daily  Continue home atenolol and xarelto for now - if trop becomes > 1000 will hold Xarelto and place on heparin gtt.  Xarelto decreased to 15 Mg daily due to creatinine clearance

## 2025-05-22 NOTE — ASSESSMENT & PLAN NOTE
Presenting to the ED with right-sided chest pain that onset after vomiting and diarrhea -pain resolved prior to analgesia in the ED  HS troponin: 115/292/480  EKG does show T wave inversions inferolaterally-no prior for comparison other than EKG impression from March 2025 stating paced rhythm  Cardiology felt no indication for transfer or heparin GTT at this time  Trend troponin until peak-if troponin increases significantly (>1000) would initiate heparin gtt. and hold home Xarelto  Obtain echo  cardiology consulted  Remains pain free on admission

## 2025-05-23 ENCOUNTER — APPOINTMENT (INPATIENT)
Dept: NON INVASIVE DIAGNOSTICS | Facility: HOSPITAL | Age: 85
DRG: 694 | End: 2025-05-23
Payer: MEDICARE

## 2025-05-23 ENCOUNTER — APPOINTMENT (INPATIENT)
Dept: NUCLEAR MEDICINE | Facility: HOSPITAL | Age: 85
DRG: 694 | End: 2025-05-23
Payer: MEDICARE

## 2025-05-23 PROBLEM — Z95.0 PRESENCE OF PERMANENT CARDIAC PACEMAKER: Status: ACTIVE | Noted: 2025-05-23

## 2025-05-23 PROBLEM — N20.1 URETEROLITHIASIS: Status: RESOLVED | Noted: 2025-05-21 | Resolved: 2025-05-23

## 2025-05-23 PROBLEM — I42.0 DILATED CARDIOMYOPATHY (HCC): Status: ACTIVE | Noted: 2025-05-23

## 2025-05-23 PROBLEM — I42.9 CARDIOMYOPATHY (HCC): Status: ACTIVE | Noted: 2025-05-23

## 2025-05-23 LAB
ANION GAP SERPL CALCULATED.3IONS-SCNC: 8 MMOL/L (ref 4–13)
ATRIAL RATE: 300 BPM
ATRIAL RATE: 326 BPM
ATRIAL RATE: 92 BPM
ATRIAL RATE: 92 BPM
BUN SERPL-MCNC: 17 MG/DL (ref 5–25)
CALCIUM SERPL-MCNC: 9.1 MG/DL (ref 8.4–10.2)
CHEST PAIN STATEMENT: NORMAL
CHEST PAIN STATEMENT: NORMAL
CHLORIDE SERPL-SCNC: 103 MMOL/L (ref 96–108)
CO2 SERPL-SCNC: 26 MMOL/L (ref 21–32)
CREAT SERPL-MCNC: 0.85 MG/DL (ref 0.6–1.3)
ERYTHROCYTE [DISTWIDTH] IN BLOOD BY AUTOMATED COUNT: 13.2 % (ref 11.6–15.1)
GFR SERPL CREATININE-BSD FRML MDRD: 63 ML/MIN/1.73SQ M
GLUCOSE SERPL-MCNC: 130 MG/DL (ref 65–140)
HCT VFR BLD AUTO: 43.6 % (ref 34.8–46.1)
HGB BLD-MCNC: 14.3 G/DL (ref 11.5–15.4)
MAX DIASTOLIC BP: 66 MMHG
MAX DIASTOLIC BP: 66 MMHG
MAX HR PERCENT: 67 %
MAX HR: 93 BPM
MAX PREDICTED HEART RATE: 137 BPM
MAX PREDICTED HEART RATE: 137 BPM
MCH RBC QN AUTO: 30.6 PG (ref 26.8–34.3)
MCHC RBC AUTO-ENTMCNC: 32.8 G/DL (ref 31.4–37.4)
MCV RBC AUTO: 93 FL (ref 82–98)
PLATELET # BLD AUTO: 219 THOUSANDS/UL (ref 149–390)
PMV BLD AUTO: 9.2 FL (ref 8.9–12.7)
POTASSIUM SERPL-SCNC: 4.5 MMOL/L (ref 3.5–5.3)
PROTOCOL NAME: NORMAL
PROTOCOL NAME: NORMAL
QRS AXIS: 104 DEGREES
QRS AXIS: 112 DEGREES
QRS AXIS: 113 DEGREES
QRS AXIS: 113 DEGREES
QRSD INTERVAL: 80 MS
QRSD INTERVAL: 80 MS
QRSD INTERVAL: 86 MS
QRSD INTERVAL: 88 MS
QT INTERVAL: 394 MS
QT INTERVAL: 414 MS
QT INTERVAL: 436 MS
QT INTERVAL: 460 MS
QTC INTERVAL: 445 MS
QTC INTERVAL: 471 MS
QTC INTERVAL: 492 MS
QTC INTERVAL: 509 MS
RATE PRESSURE PRODUCT: 9114
RBC # BLD AUTO: 4.67 MILLION/UL (ref 3.81–5.12)
REASON FOR TERMINATION: NORMAL
REASON FOR TERMINATION: NORMAL
SL CV REST NUCLEAR ISOTOPE DOSE: 9.9 MCI
SL CV STRESS NUCLEAR ISOTOPE DOSE: 33 MCI
SL CV STRESS RECOVERY BP: NORMAL MMHG
SL CV STRESS RECOVERY HR: 78 BPM
SL CV STRESS RECOVERY O2 SAT: 94 %
SODIUM SERPL-SCNC: 137 MMOL/L (ref 135–147)
SPECT HRT GATED+EF W RNC IV: 49 %
STRESS ANGINA INDEX: 0
STRESS BASELINE BP: NORMAL MMHG
STRESS BASELINE HR: 78 BPM
STRESS O2 SAT REST: 94 %
STRESS PEAK HR: 93 BPM
STRESS POST EXERCISE DUR MIN: 3 MIN
STRESS POST EXERCISE DUR MIN: 5 MIN
STRESS POST EXERCISE DUR MIN: 5 MIN
STRESS POST EXERCISE DUR SEC: 0 SEC
STRESS POST EXERCISE DUR SEC: 14 SEC
STRESS POST EXERCISE DUR SEC: 14 SEC
STRESS POST O2 SAT PEAK: 94 %
STRESS POST PEAK BP: 98 MMHG
STRESS POST PEAK HR: 93 BPM
STRESS POST PEAK HR: 93 BPM
STRESS POST PEAK SYSTOLIC BP: 104 MMHG
STRESS POST PEAK SYSTOLIC BP: 104 MMHG
STRESS/REST PERFUSION RATIO: 1.22
T WAVE AXIS: -52 DEGREES
T WAVE AXIS: -54 DEGREES
T WAVE AXIS: -60 DEGREES
T WAVE AXIS: -60 DEGREES
TARGET HR FORMULA: NORMAL
TARGET HR FORMULA: NORMAL
VENTRICULAR RATE: 69 BPM
VENTRICULAR RATE: 77 BPM
VENTRICULAR RATE: 78 BPM
VENTRICULAR RATE: 82 BPM
WBC # BLD AUTO: 12.26 THOUSAND/UL (ref 4.31–10.16)

## 2025-05-23 PROCEDURE — 93010 ELECTROCARDIOGRAM REPORT: CPT | Performed by: INTERNAL MEDICINE

## 2025-05-23 PROCEDURE — 93018 CV STRESS TEST I&R ONLY: CPT | Performed by: INTERNAL MEDICINE

## 2025-05-23 PROCEDURE — 93017 CV STRESS TEST TRACING ONLY: CPT

## 2025-05-23 PROCEDURE — 85027 COMPLETE CBC AUTOMATED: CPT | Performed by: INTERNAL MEDICINE

## 2025-05-23 PROCEDURE — 99232 SBSQ HOSP IP/OBS MODERATE 35: CPT | Performed by: INTERNAL MEDICINE

## 2025-05-23 PROCEDURE — 78452 HT MUSCLE IMAGE SPECT MULT: CPT | Performed by: INTERNAL MEDICINE

## 2025-05-23 PROCEDURE — 93016 CV STRESS TEST SUPVJ ONLY: CPT | Performed by: INTERNAL MEDICINE

## 2025-05-23 PROCEDURE — 80048 BASIC METABOLIC PNL TOTAL CA: CPT | Performed by: INTERNAL MEDICINE

## 2025-05-23 PROCEDURE — A9502 TC99M TETROFOSMIN: HCPCS

## 2025-05-23 PROCEDURE — 78452 HT MUSCLE IMAGE SPECT MULT: CPT

## 2025-05-23 RX ORDER — SPIRONOLACTONE 25 MG/1
25 TABLET ORAL DAILY
Status: DISCONTINUED | OUTPATIENT
Start: 2025-05-23 | End: 2025-05-23

## 2025-05-23 RX ORDER — LISINOPRIL 5 MG/1
2.5 TABLET ORAL DAILY
Status: DISCONTINUED | OUTPATIENT
Start: 2025-05-23 | End: 2025-05-24

## 2025-05-23 RX ORDER — METOPROLOL SUCCINATE 25 MG/1
25 TABLET, EXTENDED RELEASE ORAL DAILY
Status: DISCONTINUED | OUTPATIENT
Start: 2025-05-24 | End: 2025-05-23

## 2025-05-23 RX ORDER — REGADENOSON 0.08 MG/ML
0.4 INJECTION, SOLUTION INTRAVENOUS ONCE
Status: COMPLETED | OUTPATIENT
Start: 2025-05-23 | End: 2025-05-23

## 2025-05-23 RX ORDER — METOPROLOL SUCCINATE 25 MG/1
25 TABLET, EXTENDED RELEASE ORAL DAILY
Status: DISCONTINUED | OUTPATIENT
Start: 2025-05-23 | End: 2025-05-24

## 2025-05-23 RX ORDER — SPIRONOLACTONE 25 MG/1
25 TABLET ORAL DAILY
Status: DISCONTINUED | OUTPATIENT
Start: 2025-05-23 | End: 2025-05-24

## 2025-05-23 RX ADMIN — TIMOLOL MALEATE 1 DROP: 5 SOLUTION OPHTHALMIC at 17:41

## 2025-05-23 RX ADMIN — LEVOTHYROXINE SODIUM 50 MCG: 0.05 TABLET ORAL at 04:36

## 2025-05-23 RX ADMIN — ACETAMINOPHEN 650 MG: 325 TABLET, FILM COATED ORAL at 00:58

## 2025-05-23 RX ADMIN — PRAVASTATIN SODIUM 20 MG: 20 TABLET ORAL at 16:40

## 2025-05-23 RX ADMIN — LISINOPRIL 2.5 MG: 5 TABLET ORAL at 16:40

## 2025-05-23 RX ADMIN — REGADENOSON 0.4 MG: 0.08 INJECTION, SOLUTION INTRAVENOUS at 14:23

## 2025-05-23 RX ADMIN — OMEGA-3 FATTY ACIDS CAP 1000 MG 1000 MG: 1000 CAP at 08:19

## 2025-05-23 RX ADMIN — RIVAROXABAN 15 MG: 15 TABLET, FILM COATED ORAL at 16:40

## 2025-05-23 RX ADMIN — BRIMONIDINE TARTRATE 1 DROP: 2 SOLUTION/ DROPS OPHTHALMIC at 17:41

## 2025-05-23 RX ADMIN — TIMOLOL MALEATE 1 DROP: 5 SOLUTION OPHTHALMIC at 08:19

## 2025-05-23 RX ADMIN — BRIMONIDINE TARTRATE 1 DROP: 2 SOLUTION/ DROPS OPHTHALMIC at 08:20

## 2025-05-23 RX ADMIN — Medication 30 MG: at 08:19

## 2025-05-23 RX ADMIN — CALCIUM 500 MG: 500 TABLET ORAL at 08:19

## 2025-05-23 RX ADMIN — METOPROLOL SUCCINATE 25 MG: 25 TABLET, EXTENDED RELEASE ORAL at 17:44

## 2025-05-23 NOTE — ASSESSMENT & PLAN NOTE
Patient reports previous echocardiograms normal, will attempt to obtain routine records from [facility].  EF currently 25%  Etiology: Unclear, nuclear stress test ordered today, Reached out to [facility] for pacemaker interrogation -last upload 5/15/25 - awaiting report   GDMT: Metoprolol succinate 25 mg daily (switched from atenolol this admission), spironolactone 25 mg (started 5/23)\  Price check  of Jardiance $149, and Entresto $ 167 and will start if affordable as blood pressure allows

## 2025-05-23 NOTE — ASSESSMENT & PLAN NOTE
Laura reports that she had sudden onset right flank pain 5/21 in the afternoon after lunch.  Pain became progressively worse prompting her to seek evaluation in the emergency department.  On her her way to the emergency department she describes epigastric chest discomfort radiating to her bilateral chest wall associated with nausea with vomiting and flank pain.  This resolved in the emergency department after pain medication administered.  She has no current chest pain at this time.    Troponin :elevated with peak of 480 - now trending down   EKG: Intermittent V-paced rhythm with underlying afib  Echo: EF 25% global hypokinesis with regional variation and abnormal diastolic function, normal RV with mildly reduced systolic function, moderate LA dilatation trace MR trace TR  CXR: No evidence of pulmonary edema/effusion

## 2025-05-23 NOTE — ASSESSMENT & PLAN NOTE
Follows with cardiology at [facility]  RC: Atenolol 25 Mg twice daily  AC: Xarelto 20 Mg daily  Continue home atenolol and xarelto for now - if trop becomes > 1000 will hold Xarelto and place on heparin gtt.  Xarelto decreased to 15 Mg daily due to creatinine clearance

## 2025-05-23 NOTE — PROGRESS NOTES
"Progress Note - Hospitalist   Name: Laura Fitzgerald 84 y.o. female I MRN: [id number]  Unit/Bed#: -[facility] I Date of Admission: 5/21/2025   Date of Service: 5/23/2025 I Hospital Day: 2    Assessment & Plan  Chest pain  Presenting to the ED with right-sided chest pain that onset after vomiting and diarrhea -pain resolved prior to analgesia in the ED  HS troponin: 115/292/480/464  Cardiology consulted, appreciate recommendations  Patient without any pain  Echo showed LVEF 25% with global hypokinesis with regional variation, akinesis/hypokinetic segments.  Had nuclear stress test that showed large area of infarct in similar distribution to echo wall motion abnormalities which is likely chronic in nature per cardiology no need for urgent/emergent cardiac cath.  Patient will have MRI outpatient to assess myocardial viability  Will follow-up with cardiology outpatient  Ureterolithiasis (Resolved: 5/23/2025)  Presenting with flank pain -CT A/P: \"Mild right-sided collecting system dilatation with a possible 1 mm calculus in the mid ureter \"  Pain controlled s/p Dilaudid x 1  Strain all urine  Patient without any pain currently.  Atrial fibrillation (HCC)  Follows with cardiology at [facility]  RC: Atenolol 25 Mg twice daily  AC: Xarelto 20 Mg daily  Continue home atenolol and xarelto for now - if trop becomes > 1000 will hold Xarelto and place on heparin gtt.  Xarelto decreased to 15 Mg daily due to creatinine clearance  Hypothyroidism  Continue home Synthroid 50 mcg daily  Cardiomyopathy (HCC)  Echo show LVEF 25%  Started on metoprolol 25 mg daily, spironolactone and lisinopril 2.5 mg  Euvolemic  LifeVest was ordered, cannot be fitted until tomorrow  Presence of permanent cardiac pacemaker      VTE Pharmacologic Prophylaxis: VTE Score: 3 Moderate Risk (Score 3-4) - Pharmacological DVT Prophylaxis Ordered: rivaroxaban (Xarelto).    Mobility:   Basic Mobility Inpatient Raw Score: 23  -[facility] Goal: 7: Walk 25 feet " or more  JH-HLM Achieved: 7: Walk 25 feet or more  JH-HLM Goal achieved. Continue to encourage appropriate mobility.    Patient Centered Rounds: I performed bedside rounds with nursing staff today.   Discussions with Specialists or Other Care Team Provider: sukhjinder yeboah     Education and Discussions with Family / Patient: Updated  (son) at bedside.    Current Length of Stay: 2 day(s)  Current Patient Status: Inpatient   Certification Statement: The patient will continue to require additional inpatient hospital stay due to pending life vest fit   Discharge Plan: Anticipate discharge tomorrow to home.    Code Status: Level 3 - DNAR and DNI    Subjective     Is feeling well, no symptoms     Objective :  Temp:  [97.3 °F (36.3 °C)-97.9 °F (36.6 °C)] 97.4 °F (36.3 °C)  HR:  [72-80] 79  BP: (100-119)/(60-84) 108/71  Resp:  [14-16] 16  SpO2:  [94 %-97 %] 97 %  O2 Device: None (Room air)    Body mass index is 24.98 kg/m².     Input and Output Summary (last 24 hours):     Intake/Output Summary (Last 24 hours) at 5/23/2025 1830  Last data filed at 5/23/2025 1718  Gross per 24 hour   Intake 900 ml   Output 1150 ml   Net -250 ml       Physical Exam  Vitals and nursing note reviewed.   Constitutional:       General: She is not in acute distress.     Appearance: She is not diaphoretic.   HENT:      Head: Normocephalic.     Eyes:      General:         Right eye: No discharge.         Left eye: No discharge.       Cardiovascular:      Rate and Rhythm: Normal rate and regular rhythm.   Pulmonary:      Effort: Pulmonary effort is normal. No respiratory distress.      Breath sounds: Normal breath sounds. No wheezing, rhonchi or rales.   Abdominal:      General: There is no distension.      Palpations: Abdomen is soft.      Tenderness: There is no abdominal tenderness. There is no guarding or rebound.     Musculoskeletal:      Cervical back: Normal range of motion.      Right lower leg: No edema.      Left lower leg: No  edema.     Skin:     General: Skin is warm.     Neurological:      Mental Status: She is alert and oriented to person, place, and time.     Psychiatric:         Mood and Affect: Mood normal.         Behavior: Behavior normal.         Thought Content: Thought content normal.         Judgment: Judgment normal.           Lines/Drains:        Telemetry:  Telemetry Orders (From admission, onward)               24 Hour Telemetry Monitoring  Continuous x 24 Hours (Telem)        Expiring   Question:  Reason for 24 Hour Telemetry  Answer:  PCI/EP study (including pacer and ICD implementation), Cardiac surgery, MI, abnormal cardiac cath, and chest pain- rule out MI                     Telemetry Reviewed: Normal Sinus Rhythm  Indication for Continued Telemetry Use: Lifevest (remains on tele entire hospital stay)               Lab Results: I have reviewed the following results:   Results from last 7 days   Lab Units 05/23/25 0432 05/22/25 0413 05/21/25  1854   WBC Thousand/uL 12.26*   < > 11.21*   HEMOGLOBIN g/dL 14.3   < > 14.8   HEMATOCRIT % 43.6   < > 46.9*   PLATELETS Thousands/uL 219   < > 261   SEGS PCT %  --   --  71   LYMPHO PCT %  --   --  22   MONO PCT %  --   --  6   EOS PCT %  --   --  1    < > = values in this interval not displayed.     Results from last 7 days   Lab Units 05/23/25 0432 05/22/25 0413 05/21/25  1854   SODIUM mmol/L 137   < > 136   POTASSIUM mmol/L 4.5   < > 4.3   CHLORIDE mmol/L 103   < > 102   CO2 mmol/L 26   < > 24   BUN mg/dL 17   < > 20   CREATININE mg/dL 0.85   < > 1.10   ANION GAP mmol/L 8   < > 10   CALCIUM mg/dL 9.1   < > 9.5   ALBUMIN g/dL  --   --  4.3   TOTAL BILIRUBIN mg/dL  --   --  0.58   ALK PHOS U/L  --   --  90   ALT U/L  --   --  25   AST U/L  --   --  27   GLUCOSE RANDOM mg/dL 130   < > 213*    < > = values in this interval not displayed.     Results from last 7 days   Lab Units 05/21/25  1854   INR  1.44*                   Recent Cultures (last 7 days):         Imaging  Results Review: I reviewed radiology reports from this admission including: stress test  and Echocardiogram.  Other Study Results Review: No additional pertinent studies reviewed.    Last 24 Hours Medication List:     Current Facility-Administered Medications:     acetaminophen (TYLENOL) tablet 650 mg, Q6H PRN    aluminum-magnesium hydroxide-simethicone (MAALOX) oral suspension 30 mL, Q6H PRN    brimonidine tartrate 0.2 % ophthalmic solution 1 drop, BID **AND** timolol (TIMOPTIC) 0.5 % ophthalmic solution 1 drop, BID    calcium carbonate (OYSTER SHELL,OSCAL) 500 mg tablet 500 mg, Daily    co-enzyme Q-10 capsule 30 mg, Daily    fish oil capsule 1,000 mg, Daily    levothyroxine tablet 50 mcg, Early Morning    lisinopril (ZESTRIL) tablet 2.5 mg, Daily    metoprolol succinate (TOPROL-XL) 24 hr tablet 25 mg, Daily    Netarsudil Dimesylate 0.02 % SOLN 1 drop, HS    oxyCODONE (ROXICODONE) split tablet 2.5 mg, Q6H PRN **OR** oxyCODONE (ROXICODONE) IR tablet 5 mg, Q6H PRN    pravastatin (PRAVACHOL) tablet 20 mg, Daily With Dinner    rivaroxaban (XARELTO) tablet 15 mg, Daily With Dinner    senna (SENOKOT) tablet 8.6 mg, HS PRN    spironolactone (ALDACTONE) tablet 25 mg, Daily    Administrative Statements   Today, Patient Was Seen By: Alicja Perez MD      **Please Note: This note may have been constructed using a voice recognition system.**

## 2025-05-23 NOTE — ASSESSMENT & PLAN NOTE
Dual chamber PPM placed in 2006 with generator change in 2022    Interrogation report received from [employer], patient is ventricularly paced 90% of the time, Set at VVIR with lower rate of 70

## 2025-05-23 NOTE — ASSESSMENT & PLAN NOTE
Presenting to the ED with right-sided chest pain that onset after vomiting and diarrhea -pain resolved prior to analgesia in the ED  HS troponin: 115/292/480/464  Cardiology consulted, appreciate recommendations  Patient without any pain  Echo showed LVEF 25% with global hypokinesis with regional variation, akinesis/hypokinetic segments.  Had nuclear stress test that showed large area of infarct in similar distribution to echo wall motion abnormalities which is likely chronic in nature per cardiology no need for urgent/emergent cardiac cath.  Patient will have MRI outpatient to assess myocardial viability  Will follow-up with cardiology outpatient

## 2025-05-23 NOTE — ASSESSMENT & PLAN NOTE
Patient with a history of atrial fibrillation -follows with Dr. Escobar from [place] cardiology  Anticoagulated with Xarelto for stroke prevention, metoprolol succinate 25 mg daily (switched from atenolol this admission   Telemetry: intermittent V pacing

## 2025-05-23 NOTE — PROGRESS NOTES
Progress Note - Cardiology   Name: Laura Fitzgerald 84 y.o. female I MRN: [id number]  Unit/Bed#: -01 I Date of Admission: 5/21/2025   Date of Service: 5/23/2025 I Hospital Day: 2    Assessment & Plan  Chest pain  Laura reports that she had sudden onset right flank pain 5/21 in the afternoon after lunch.  Pain became progressively worse prompting her to seek evaluation in the emergency department.  On her her way to the emergency department she describes epigastric chest discomfort radiating to her bilateral chest wall associated with nausea with vomiting and flank pain.  This resolved in the emergency department after pain medication administered.  She has no current chest pain at this time.    Troponin :elevated with peak of 480 - now trending down   EKG: Intermittent V-paced rhythm with underlying afib  Echo: EF 25% global hypokinesis with regional variation and abnormal diastolic function, normal RV with mildly reduced systolic function, moderate LA dilatation trace MR trace TR  CXR: No evidence of pulmonary edema/effusion  Atrial fibrillation (HCC)  Patient with a history of atrial fibrillation -follows with Dr. Escobar from [place] cardi[facility]  Anticoagulated with Xarelto for stroke prevention, metoprolol succinate 25 mg daily (switched from atenolol this admission   Telemetry: intermittent V pacing  Cardiomyopathy (HCC)  Patient reports previous echocardiograms normal, will attempt to obtain routine records from [facility].  EF currently 25%  Etiology: Unclear, nuclear stress test ordered today, Reached out to [facility] for pacemaker interrogation -last upload 5/15/25 - awaiting report   GDMT: Metoprolol succinate 25 mg daily (switched from atenolol this admission), spironolactone 25 mg (started 5/23)\  Price check  of Jardiance $149, and Entresto $ 167 and will start if affordable as blood pressure allows   Presence of permanent cardiac pacemaker  Dual chamber PPM placed in 2006 with  generator change in 2022    Interrogation report received from [employer], patient is ventricularly paced 90% of the time, Set at VVIR with lower rate of 70    Plan  Start Spironolactone today  Nuclear medicine stress test today    Subjective   Patient with c/o of palpable chest soreness overnight.  She suspects this is secondary to echocardiogram yesterday.  No chest soreness at this time    Objective :  Temp:  [97.3 °F (36.3 °C)-97.9 °F (36.6 °C)] 97.4 °F (36.3 °C)  HR:  [67-80] 72  BP: (102-111)/(72-74) 108/74  Resp:  [14-16] 16  SpO2:  [94 %-97 %] 95 %  O2 Device: None (Room air)  Orthostatic Blood Pressures      Flowsheet Row Most Recent Value   Blood Pressure 108/74 filed at 05/23/2025 0715   Patient Position - Orthostatic VS Lying filed at 05/23/2025 0439          First Weight: Weight - Scale: 64 kg (141 lb) (05/21/25 1847)  Vitals:    05/21/25 2208 05/22/25 1418   Weight: 64 kg (141 lb) 64 kg (141 lb 1.5 oz)     Physical Exam  Constitutional:       Appearance: Normal appearance.   HENT:      Head: Normocephalic and atraumatic.      Nose: Nose normal.      Mouth/Throat:      Mouth: Mucous membranes are moist.     Cardiovascular:      Rate and Rhythm: Normal rate. Rhythm irregular.      Pulses: Normal pulses.      Heart sounds: Normal heart sounds.   Pulmonary:      Effort: Pulmonary effort is normal.      Breath sounds: Normal breath sounds.   Abdominal:      Palpations: Abdomen is soft.     Musculoskeletal:      Cervical back: Normal range of motion.      Right lower leg: No edema.      Left lower leg: No edema.     Skin:     General: Skin is warm.      Capillary Refill: Capillary refill takes less than 2 seconds.     Neurological:      General: No focal deficit present.      Mental Status: She is alert and oriented to person, place, and time. Mental status is at baseline.     Psychiatric:         Mood and Affect: Mood normal.         Behavior: Behavior normal.         Thought Content: Thought content normal.          Judgment: Judgment normal.         Lab Results: I have reviewed the following results:  Results from last 7 days   Lab Units 05/23/25  0432 05/22/25  0413 05/21/25  1854   WBC Thousand/uL 12.26* 9.42 11.21*   HEMOGLOBIN g/dL 14.3 14.7 14.8   HEMATOCRIT % 43.6 44.2 46.9*   PLATELETS Thousands/uL 219 229 261     Results from last 7 days   Lab Units 05/23/25  0432 05/22/25  0413 05/21/25  1854   POTASSIUM mmol/L 4.5 4.7 4.3   CHLORIDE mmol/L 103 105 102   CO2 mmol/L 26 21 24   BUN mg/dL 17 19 20   CREATININE mg/dL 0.85 0.82 1.10   CALCIUM mg/dL 9.1 9.1 9.5     Results from last 7 days   Lab Units 05/21/25  1854   INR  1.44*   PTT seconds 28     Lab Results   Component Value Date    HGBA1C 5.6 05/19/2020     Lab Results   Component Value Date    CKTOTAL 106 08/09/2014       Imaging Results Review: I reviewed radiology reports from this admission including: Echocardiogram.  Other Study Results Review: EKG was reviewed.     VTE Pharmacologic Prophylaxis: VTE covered by:  rivaroxaban, Oral, 15 mg at 05/22/25 1818     VTE Mechanical Prophylaxis: sequential compression device

## 2025-05-23 NOTE — ASSESSMENT & PLAN NOTE
Echo show LVEF 25%  Started on metoprolol 25 mg daily, spironolactone and lisinopril 2.5 mg  Euvolemic  LifeVest was ordered, cannot be fitted until tomorrow

## 2025-05-23 NOTE — PLAN OF CARE
Problem: CARDIOVASCULAR - ADULT  Goal: Maintains optimal cardiac output and hemodynamic stability  Description: INTERVENTIONS:  - Monitor I/O, vital signs and rhythm  - Monitor for S/S and trends of decreased cardiac output  - Administer and titrate ordered vasoactive medications to optimize hemodynamic stability  - Assess quality of pulses, skin color and temperature  - Assess for signs of decreased coronary artery perfusion  - Instruct patient to report change in severity of symptoms  Outcome: Progressing  Goal: Absence of cardiac dysrhythmias or at baseline rhythm  Description: INTERVENTIONS:  - Continuous cardiac monitoring, vital signs, obtain 12 lead EKG if ordered  - Administer antiarrhythmic and heart rate control medications as ordered  - Monitor electrolytes and administer replacement therapy as ordered  Outcome: Progressing      Exposure to blood

## 2025-05-24 VITALS
DIASTOLIC BLOOD PRESSURE: 64 MMHG | OXYGEN SATURATION: 92 % | SYSTOLIC BLOOD PRESSURE: 95 MMHG | WEIGHT: 141 LBS | BODY MASS INDEX: 24.98 KG/M2 | HEART RATE: 75 BPM | TEMPERATURE: 97 F | HEIGHT: 63 IN | RESPIRATION RATE: 17 BRPM

## 2025-05-24 LAB
ANION GAP SERPL CALCULATED.3IONS-SCNC: 7 MMOL/L (ref 4–13)
BUN SERPL-MCNC: 12 MG/DL (ref 5–25)
CALCIUM SERPL-MCNC: 8.8 MG/DL (ref 8.4–10.2)
CHLORIDE SERPL-SCNC: 105 MMOL/L (ref 96–108)
CO2 SERPL-SCNC: 26 MMOL/L (ref 21–32)
CREAT SERPL-MCNC: 0.71 MG/DL (ref 0.6–1.3)
ERYTHROCYTE [DISTWIDTH] IN BLOOD BY AUTOMATED COUNT: 13 % (ref 11.6–15.1)
GFR SERPL CREATININE-BSD FRML MDRD: 79 ML/MIN/1.73SQ M
GLUCOSE SERPL-MCNC: 98 MG/DL (ref 65–140)
HCT VFR BLD AUTO: 40.4 % (ref 34.8–46.1)
HGB BLD-MCNC: 13 G/DL (ref 11.5–15.4)
MCH RBC QN AUTO: 30.2 PG (ref 26.8–34.3)
MCHC RBC AUTO-ENTMCNC: 32.2 G/DL (ref 31.4–37.4)
MCV RBC AUTO: 94 FL (ref 82–98)
PLATELET # BLD AUTO: 187 THOUSANDS/UL (ref 149–390)
PMV BLD AUTO: 8.9 FL (ref 8.9–12.7)
POTASSIUM SERPL-SCNC: 3.8 MMOL/L (ref 3.5–5.3)
RBC # BLD AUTO: 4.31 MILLION/UL (ref 3.81–5.12)
SODIUM SERPL-SCNC: 138 MMOL/L (ref 135–147)
WBC # BLD AUTO: 7.91 THOUSAND/UL (ref 4.31–10.16)

## 2025-05-24 PROCEDURE — 99232 SBSQ HOSP IP/OBS MODERATE 35: CPT | Performed by: INTERNAL MEDICINE

## 2025-05-24 PROCEDURE — 99239 HOSP IP/OBS DSCHRG MGMT >30: CPT | Performed by: INTERNAL MEDICINE

## 2025-05-24 PROCEDURE — 80048 BASIC METABOLIC PNL TOTAL CA: CPT | Performed by: INTERNAL MEDICINE

## 2025-05-24 PROCEDURE — 85027 COMPLETE CBC AUTOMATED: CPT | Performed by: INTERNAL MEDICINE

## 2025-05-24 RX ORDER — METOPROLOL SUCCINATE 25 MG/1
25 TABLET, EXTENDED RELEASE ORAL DAILY
Qty: 30 TABLET | Refills: 0 | Status: SHIPPED | OUTPATIENT
Start: 2025-05-25 | End: 2025-06-24

## 2025-05-24 RX ORDER — LISINOPRIL 2.5 MG/1
2.5 TABLET ORAL DAILY
Qty: 30 TABLET | Refills: 0 | Status: SHIPPED | OUTPATIENT
Start: 2025-05-25

## 2025-05-24 RX ORDER — METOPROLOL SUCCINATE 25 MG/1
25 TABLET, EXTENDED RELEASE ORAL DAILY
Status: DISCONTINUED | OUTPATIENT
Start: 2025-05-24 | End: 2025-05-24 | Stop reason: HOSPADM

## 2025-05-24 RX ORDER — SPIRONOLACTONE 25 MG/1
25 TABLET ORAL DAILY
Status: DISCONTINUED | OUTPATIENT
Start: 2025-05-24 | End: 2025-05-24 | Stop reason: HOSPADM

## 2025-05-24 RX ORDER — LISINOPRIL 5 MG/1
2.5 TABLET ORAL DAILY
Status: DISCONTINUED | OUTPATIENT
Start: 2025-05-24 | End: 2025-05-24 | Stop reason: HOSPADM

## 2025-05-24 RX ORDER — SPIRONOLACTONE 25 MG/1
25 TABLET ORAL DAILY
Qty: 30 TABLET | Refills: 0 | Status: SHIPPED | OUTPATIENT
Start: 2025-05-25

## 2025-05-24 RX ADMIN — CALCIUM 500 MG: 500 TABLET ORAL at 09:08

## 2025-05-24 RX ADMIN — Medication 30 MG: at 09:07

## 2025-05-24 RX ADMIN — BRIMONIDINE TARTRATE 1 DROP: 2 SOLUTION/ DROPS OPHTHALMIC at 09:07

## 2025-05-24 RX ADMIN — TIMOLOL MALEATE 1 DROP: 5 SOLUTION OPHTHALMIC at 09:07

## 2025-05-24 RX ADMIN — LEVOTHYROXINE SODIUM 50 MCG: 0.05 TABLET ORAL at 05:08

## 2025-05-24 RX ADMIN — OMEGA-3 FATTY ACIDS CAP 1000 MG 1000 MG: 1000 CAP at 09:07

## 2025-05-24 NOTE — PLAN OF CARE
Problem: Potential for Falls  Goal: Patient will remain free of falls  Description: INTERVENTIONS:  - Educate patient/family on patient safety including physical limitations  - Instruct patient to call for assistance with activity   - Consider consulting OT/PT to assist with strengthening/mobility based on AM PAC & JH-HLM score  - Consult OT/PT to assist with strengthening/mobility   - Keep Call bell within reach  - Keep bed low and locked with side rails adjusted as appropriate  - Keep care items and personal belongings within reach  - Initiate and maintain comfort rounds  - Make Fall Risk Sign visible to staff  - Offer Toileting every  Hours, in advance of need  - Initiate/Maintain alarm  - Obtain necessary fall risk management equipment:   - Apply yellow socks and bracelet for high fall risk patients  - Consider moving patient to room near nurses station  Outcome: Progressing     Problem: CARDIOVASCULAR - ADULT  Goal: Maintains optimal cardiac output and hemodynamic stability  Description: INTERVENTIONS:  - Monitor I/O, vital signs and rhythm  - Monitor for S/S and trends of decreased cardiac output  - Administer and titrate ordered vasoactive medications to optimize hemodynamic stability  - Assess quality of pulses, skin color and temperature  - Assess for signs of decreased coronary artery perfusion  - Instruct patient to report change in severity of symptoms  Outcome: Progressing  Goal: Absence of cardiac dysrhythmias or at baseline rhythm  Description: INTERVENTIONS:  - Continuous cardiac monitoring, vital signs, obtain 12 lead EKG if ordered  - Administer antiarrhythmic and heart rate control medications as ordered  - Monitor electrolytes and administer replacement therapy as ordered  Outcome: Progressing     Problem: GENITOURINARY - ADULT  Goal: Maintains or returns to baseline urinary function  Description: INTERVENTIONS:  - Assess urinary function  - Encourage oral fluids to ensure adequate hydration if  ordered  - Administer IV fluids as ordered to ensure adequate hydration  - Administer ordered medications as needed  - Offer frequent toileting  - Follow urinary retention protocol if ordered  Outcome: Progressing

## 2025-05-24 NOTE — DISCHARGE SUMMARY
Discharge Summary - Hospitalist   Name: Laura Fitzgerald 84 y.o. female I MRN: [id number]  Unit/Bed#: -01 I Date of Admission: 5/21/2025   Date of Service: 5/24/2025 I Hospital Day: 3     Assessment & Plan  Chest pain  Presenting to the ED with right-sided chest pain that onset after vomiting and diarrhea -pain resolved prior to analgesia in the ED  HS troponin: 115/292/480/464  Cardiology consulted, appreciate recommendations  Patient without any pain  Echo showed LVEF 25% with global hypokinesis with regional variation, akinesis/hypokinetic segments.  Had nuclear stress test that showed large area of infarct in similar distribution to echo wall motion abnormalities which is likely chronic in nature per cardiology no need for urgent/emergent cardiac cath.  Patient will have MRI outpatient to assess myocardial viability  Will follow-up with cardiology outpatient  Atrial fibrillation (HCC)  Follows with cardiology at [facility]  RC: Atenolol 25 Mg twice daily  AC: Xarelto 20 Mg daily  Continue   Hypothyroidism  Continue home Synthroid 50 mcg daily  Cardiomyopathy (HCC)  Echo show LVEF 25%  Started on metoprolol 25 mg daily, spironolactone and lisinopril 2.5 mg  Euvolemic  LifeVest was ordered,fitted today   Presence of permanent cardiac pacemaker       Medical Problems       Resolved Problems  Date Reviewed: 2/22/2023          Resolved    Ureterolithiasis 5/23/2025     Resolved by  Alicja Perez MD        Discharging Physician / Practitioner: Alicja Perez MD  PCP: Carmita Alvarado DO  Admission Date:   Admission Orders (From admission, onward)       Ordered        05/21/25 2105  INPATIENT ADMISSION  Once                          Discharge Date: 05/24/25    Next Steps for Physician/AP Assuming Care:      Test Results Pending at Discharge (will require follow up):  none    Medication Changes for Discharge & Rationale:   Lisinopril 2.5, spironolactone and metoprolol 25 mg started    Dc atenolol   See after visit summary for reconciled discharge medications provided to patient and/or family.     Consultations During Hospital Stay:  cardiology    Procedures Performed:   none    Significant Findings / Test Results:   CT abdomen pelvis with contrast  Result Date: 5/21/2025  Impression: Mild right-sided collecting system dilatation with a possible 1 mm calculus in the mid ureter. There is a prominent vessel coursing immediately alongside the right ureter, and this punctate focus could be vascular but appears to be separate. Recommend correlation with any symptoms of right-sided renal colic. Diverticulosis. No evidence of acute diverticulitis.     X-ray chest 1 view portable  Result Date: 5/21/2025  Impression: No acute cardiopulmonary disease. Right ventricular lead is redundant within the right ventricle. This appearance is unchanged dating back to at least 11/5/2015. Recommend electrophysiology follow-up. The study was marked in EPIC for immediate notification.     XR hip/pelv 2-3 vws right if performed  Result Date: 5/21/2025  Impression: No acute osseous abnormality. Computerized Assisted Algorithm (CAA) may have been used to analyze all applicable images.     Incidental Findings:   none     Hospital Course:   Laura Fitzgerald is a 84 y.o. female patient who originally presented to the hospital on 5/21/2025 due to right sided/ back pain . Was noted to have elevated troponin on admission, echo showed low EF. Cardiology was consulted, pt had nuclear stress test. Was started on metoprolol, lisinopril and spironolactone. Had life vest fitted prior to dc. Will f/u with cardiology     CT showed a possible stone. Patient without any pain since admission. Some hematuria. Advised her to f/u with pcp if hematuria is not clearing       Please see above list of diagnoses and related plan for additional information.     Discharge Day Visit / Exam:   Subjective:  feeling well   Vitals: Blood Pressure:  "95/64 (05/24/25 0910)  Pulse: 75 (05/24/25 0910)  Temperature: (!) 97 °F (36.1 °C) (05/24/25 0657)  Temp Source: Oral (05/24/25 0657)  Respirations: 17 (05/24/25 0657)  Height: 5' 3\" (160 cm) (05/23/25 1416)  Weight - Scale: 64 kg (141 lb) (05/23/25 1416)  SpO2: 92 % (05/24/25 0910)  Physical Exam  Vitals and nursing note reviewed.   Constitutional:       General: She is not in acute distress.     Appearance: She is not diaphoretic.   HENT:      Head: Normocephalic.     Eyes:      General:         Right eye: No discharge.         Left eye: No discharge.       Cardiovascular:      Rate and Rhythm: Normal rate and regular rhythm.   Pulmonary:      Effort: Pulmonary effort is normal. No respiratory distress.      Breath sounds: Normal breath sounds. No wheezing, rhonchi or rales.   Abdominal:      General: There is no distension.      Palpations: Abdomen is soft.      Tenderness: There is no abdominal tenderness. There is no guarding or rebound.     Musculoskeletal:      Cervical back: Normal range of motion.      Right lower leg: No edema.      Left lower leg: No edema.     Skin:     General: Skin is warm.     Neurological:      Mental Status: She is alert and oriented to person, place, and time.     Psychiatric:         Mood and Affect: Mood normal.         Behavior: Behavior normal.         Thought Content: Thought content normal.         Judgment: Judgment normal.          Discussion with Family: Patient declined call to .     Discharge instructions/Information to patient and family:   See after visit summary for information provided to patient and family.      Provisions for Follow-Up Care:  See after visit summary for information related to follow-up care and any pertinent home health orders.      Mobility at time of Discharge:   Basic Mobility Inpatient Raw Score: 23  JH-HLM Goal: 7: Walk 25 feet or more  JH-HLM Achieved: 7: Walk 25 feet or more  HLM Goal achieved. Continue to encourage appropriate " mobility.     Disposition:   Home    Planned Readmission: no    Administrative Statements   Discharge Statement:  I have spent a total time of 36 minutes in caring for this patient on the day of the visit/encounter. .    **Please Note: This note may have been constructed using a voice recognition system**

## 2025-05-24 NOTE — PLAN OF CARE
Problem: Potential for Falls  Goal: Patient will remain free of falls  Description: INTERVENTIONS:  - Educate patient/family on patient safety including physical limitations  - Instruct patient to call for assistance with activity   - Consider consulting OT/PT to assist with strengthening/mobility based on AM PAC & JH-HLM score  - Consult OT/PT to assist with strengthening/mobility   - Keep Call bell within reach  - Keep bed low and locked with side rails adjusted as appropriate  - Keep care items and personal belongings within reach  - Initiate and maintain comfort rounds  - Make Fall Risk Sign visible to staff  - Offer Toileting every  Hours, in advance of need  - Initiate/Maintain alarm  - Obtain necessary fall risk management equipment:   - Apply yellow socks and bracelet for high fall risk patients  - Consider moving patient to room near nurses station  Outcome: Progressing     Problem: CARDIOVASCULAR - ADULT  Goal: Absence of cardiac dysrhythmias or at baseline rhythm  Description: INTERVENTIONS:  - Continuous cardiac monitoring, vital signs, obtain 12 lead EKG if ordered  - Administer antiarrhythmic and heart rate control medications as ordered  - Monitor electrolytes and administer replacement therapy as ordered  Outcome: Progressing

## 2025-05-24 NOTE — NURSING NOTE
RN notified provider of 2 occurrences of blood-tinged urine. Patient stated that her urine has been dark since her admission, but said this urine is much darker. RN strained urine as ordered. Patient denies abdominal pain, dysuria, or other symptoms. No new orders at this time.

## 2025-05-24 NOTE — ASSESSMENT & PLAN NOTE
Follows with cardiology at [facility]  RC: Atenolol 25 Mg twice daily  AC: Xarelto 20 Mg daily  Continue

## 2025-05-24 NOTE — ASSESSMENT & PLAN NOTE
Echo show LVEF 25%  Started on metoprolol 25 mg daily, spironolactone and lisinopril 2.5 mg  Euvolemic  LifeVest was ordered,fitted today

## 2025-05-24 NOTE — PROGRESS NOTES
"Cardiology Progress Note - Laura Fitzgerald 84 y.o. female MRN: [id number]    Unit/Bed#: -01 Encounter: [id number]      Assessment:  Principal Problem:    Chest pain  Active Problems:    Atrial fibrillation (HCC)    Hypothyroidism    Cardiomyopathy (HCC)    Presence of permanent cardiac pacemaker      Plan:  Patient with no issue overnight.  She has no chest pain or significant dyspnea.  She is paced on telemetry.  BMP today with potassium of 3.8 and creatinine of 0.71.  Hemoglobin is 13.0.  She is for LifeVest placement this afternoon.  I contacted the rep.  She is for discharge planning thereafter.  She will follow with our office as an outpatient.    Subjective:   Patient seen and examined.  No significant events overnight.  negative.    Objective:     Vitals: Blood pressure 95/64, pulse 75, temperature (!) 97 °F (36.1 °C), temperature source Oral, resp. rate 17, height 5' 3\" (1.6 m), weight 64 kg (141 lb), SpO2 92%., Body mass index is 24.98 kg/m².,   Orthostatic Blood Pressures      Flowsheet Row Most Recent Value   Blood Pressure 95/64 filed at 05/24/2025 0910   Patient Position - Orthostatic VS Sitting filed at 05/24/2025 0657        ,      Intake/Output Summary (Last 24 hours) at 5/24/2025 1116  Last data filed at 5/24/2025 0832  Gross per 24 hour   Intake 870 ml   Output 1650 ml   Net -780 ml       No significant arrhythmias seen on telemetry review.       Physical Exam:    GEN: Laura Fitzgerald appears well, alert and oriented x 3, pleasant and cooperative   NECK: supple, no carotid bruits, no JVD or HJR  HEART: normal rate, regular rhythm, normal S1 and S2, no murmurs, clicks, gallops or rubs   LUNGS: clear to auscultation bilaterally; no wheezes, rales, or rhonchi   ABDOMEN:  no distention  EXTREMITIES: peripheral pulses normal; no clubbing, cyanosis, or edema  SKIN: warm and well perfused, no suspicious lesions on exposed skin    Labs & Results:    No results displayed because visit has over " 200 results.          NM myocardial perfusion spect (rx stress and/or rest)  Result Date: 5/23/2025  Narrative:   Stress ECG: No ST deviation is noted. The ECG was not diagnostic due to pharmacological (vasodilator) stress.   Stress Combined Conclusion: There is stone-infarct ischemia. Findings are consistent with infarction.   Perfusion: There is a left ventricular perfusion defect that is large in size present in the mid to apical anterior, lateral, septal and apex location(s).   Stress Function: Left ventricular function post-stress is abnormal. Global function is mildly reduced. There were multiple regional abnormalities during stress. Stress ejection fraction is 49%. There is a defect in the mid anterior, inferior, lateral and septal location(s). The defect has severely reduced function. There is a defect in the apical anterior, inferior, lateral and septal location(s). The defect is akinetic. There is a defect in the apical location(s). The defect is akinetic. Large infarct involving the mid-apical segments circumferentially and apex, likely due to multivessel coronary disease given the extent of affected territory. There is some stone-infarct ischemia.     Echo complete w/ contrast if indicated  Result Date: 5/22/2025  Narrative:   Left Ventricle: Left ventricular cavity size is normal. Wall thickness is mildly increased. The left ventricular ejection fraction is 25% by 3D quantification. Systolic function is severely reduced. Global longitudinal strain is reduced at -7%. There is severe global hypokinesis with regional variation. Diastolic function is abnormal.   Right Ventricle: Systolic function is mildly reduced.   Left Atrium: The atrium is moderately dilated.   Aortic Valve: There is aortic valve sclerosis.   The following segments are akinetic: basal inferoseptal, mid anteroseptal, mid inferoseptal, mid inferior, mid inferolateral, apical anterior, apical septal, apical inferior, apical lateral and apex.    The following segments are hypokinetic: basal anterior, basal anteroseptal, basal inferior, basal inferolateral, basal anterolateral, mid anterior and mid anterolateral. Severely reduced LVEF with only basal segments spared, and even then not circumferentially. Findings might represent stress-induced cardiomyopathy if multivessel CAD is ruled out.     CT abdomen pelvis with contrast  Result Date: 5/21/2025  Narrative: CT ABDOMEN AND PELVIS WITH IV CONTRAST INDICATION: diffuse abd pain, vomiting. . COMPARISON: None. TECHNIQUE: CT examination of the abdomen and pelvis was performed. Multiplanar 2D reformatted images were created from the source data. This examination, like all CT scans performed in the [facility], was performed utilizing techniques to minimize radiation dose exposure, including the use of iterative reconstruction and automated exposure control. Radiation dose length product (DLP) for this visit: 568.54 mGy-cm. IV Contrast: 100 mL of iohexol (OMNIPAQUE) Enteric Contrast: Not administered. FINDINGS: ABDOMEN LOWER CHEST: No clinically significant abnormality in the visualized lower chest. LIVER/BILIARY TREE: Few subcentimeter hyperdense foci most likely benign, possibly flash filling hemangiomas. GALLBLADDER: No calcified gallstones. No pericholecystic inflammatory change. SPLEEN: Unremarkable. PANCREAS: Unremarkable. ADRENAL GLANDS: Unremarkable. KIDNEYS/URETERS: Mild right-sided collecting system dilatation with a prominent right periureteral vessel coursing along the posterior and medial aspect of the ureter. There is a tiny punctate focus (2, 89) measuring approximately 1 mm which appears to be within the lumen of the ureter rather than outside, and may represent a small calculus. The ureter is normal in caliber below this area. STOMACH AND BOWEL: Diverticulosis with no evidence of acute diverticulitis. Large amount of retained stool throughout the colon. No evidence of bowel  obstruction. APPENDIX: No findings to suggest appendicitis. ABDOMINOPELVIC CAVITY: No ascites. No pneumoperitoneum. No lymphadenopathy. VESSELS: Unremarkable for patient's age. PELVIS REPRODUCTIVE ORGANS: Unremarkable for patient's age. URINARY BLADDER: Collapsed ABDOMINAL WALL/INGUINAL REGIONS: Unremarkable. BONES: No acute fracture or suspicious osseous lesion. Spinal degenerative changes.     Impression: Mild right-sided collecting system dilatation with a possible 1 mm calculus in the mid ureter. There is a prominent vessel coursing immediately alongside the right ureter, and this punctate focus could be vascular but appears to be separate. Recommend correlation with any symptoms of right-sided renal colic. Diverticulosis. No evidence of acute diverticulitis. The study was marked in EPIC for immediate notification. Workstation performed: [id number]     X-ray chest 1 view portable  Result Date: 5/21/2025  Narrative: XR CHEST PORTABLE INDICATION: cp. COMPARISON: Lumbar CT dated 11/5/2015 FINDINGS: Clear lungs. No pneumothorax or pleural effusion. Normal cardiac silhouette size. The right ventricular lead appears to be pulled back and redundant within the right ventricle. However this configuration is unchanged dating back to at least 11/5/2015. Bones are unremarkable for age. Normal upper abdomen.     Impression: No acute cardiopulmonary disease. Right ventricular lead is redundant within the right ventricle. This appearance is unchanged dating back to at least 11/5/2015. Recommend electrophysiology follow-up. The study was marked in EPIC for immediate notification. Workstation performed: [id number]     XR hip/pelv 2-3 vws right if performed  Result Date: 5/21/2025  Narrative: XR HIP/PELV 2-3 VWS RIGHT W PELVIS IF PERFORMED INDICATION: hip pain. COMPARISON: None FINDINGS: No acute fracture or dislocation. Mild hip osteoarthritis. No lytic or blastic osseous lesion. Unremarkable soft tissues. Unremarkable visualized  lumbar spine.     Impression: No acute osseous abnormality. Computerized Assisted Algorithm (CAA) may have been used to analyze all applicable images. Workstation performed: [id number]       EKG personally reviewed by Krish Toney MD.     Counseling / Coordination of Care  Total floor / unit time spent today 30 minutes.  Greater than 50% of total time was spent with the patient and / or family counseling and / or coordination of care.

## 2025-05-28 LAB
CHEST PAIN STATEMENT: NORMAL
CHEST PAIN STATEMENT: NORMAL
MAX DIASTOLIC BP: 66 MMHG
MAX DIASTOLIC BP: 66 MMHG
MAX PREDICTED HEART RATE: 137 BPM
MAX PREDICTED HEART RATE: 137 BPM
PROTOCOL NAME: NORMAL
PROTOCOL NAME: NORMAL
REASON FOR TERMINATION: NORMAL
REASON FOR TERMINATION: NORMAL
STRESS POST EXERCISE DUR MIN: 5 MIN
STRESS POST EXERCISE DUR MIN: 5 MIN
STRESS POST EXERCISE DUR SEC: 14 SEC
STRESS POST EXERCISE DUR SEC: 14 SEC
STRESS POST PEAK HR: 93 BPM
STRESS POST PEAK HR: 93 BPM
STRESS POST PEAK SYSTOLIC BP: 104 MMHG
STRESS POST PEAK SYSTOLIC BP: 104 MMHG
TARGET HR FORMULA: NORMAL
TARGET HR FORMULA: NORMAL

## 2025-06-19 ENCOUNTER — OFFICE VISIT (OUTPATIENT)
Dept: CARDIOLOGY CLINIC | Facility: CLINIC | Age: 85
End: 2025-06-19
Payer: MEDICARE

## 2025-06-19 VITALS
HEART RATE: 77 BPM | HEIGHT: 63 IN | BODY MASS INDEX: 24.98 KG/M2 | DIASTOLIC BLOOD PRESSURE: 72 MMHG | WEIGHT: 141 LBS | SYSTOLIC BLOOD PRESSURE: 116 MMHG

## 2025-06-19 DIAGNOSIS — I42.9 CARDIOMYOPATHY, UNSPECIFIED TYPE (HCC): ICD-10-CM

## 2025-06-19 DIAGNOSIS — I42.0 DILATED CARDIOMYOPATHY (HCC): Primary | ICD-10-CM

## 2025-06-19 DIAGNOSIS — E78.2 MIXED HYPERLIPIDEMIA: ICD-10-CM

## 2025-06-19 DIAGNOSIS — I48.19 PERSISTENT ATRIAL FIBRILLATION (HCC): ICD-10-CM

## 2025-06-19 PROCEDURE — 99214 OFFICE O/P EST MOD 30 MIN: CPT | Performed by: PHYSICIAN ASSISTANT

## 2025-06-19 RX ORDER — METOPROLOL SUCCINATE 25 MG/1
12.5 TABLET, EXTENDED RELEASE ORAL
Qty: 45 TABLET | Refills: 3 | Status: SHIPPED | OUTPATIENT
Start: 2025-06-19 | End: 2026-06-14

## 2025-06-19 RX ORDER — RIVAROXABAN 20 MG/1
20 TABLET, FILM COATED ORAL
Qty: 90 TABLET | Refills: 3 | Status: SHIPPED | OUTPATIENT
Start: 2025-06-19

## 2025-06-19 RX ORDER — SIMVASTATIN 20 MG
20 TABLET ORAL
Qty: 90 TABLET | Refills: 3 | Status: SHIPPED | OUTPATIENT
Start: 2025-06-19

## 2025-06-19 RX ORDER — SPIRONOLACTONE 25 MG/1
12.5 TABLET ORAL DAILY
Qty: 45 TABLET | Refills: 3 | Status: SHIPPED | OUTPATIENT
Start: 2025-06-19

## 2025-06-19 NOTE — PROGRESS NOTES
Cardiology Office Follow Up  Laura Fitzgerald  1940  [id number]      ASSESSMENT:  Cardiomyopathy, unknown etiology, EF 25%  Persistent atrial fibrillation  S/P SJM DC PPM in situ    DIAGNOSTICS:  Echo 5/22/2025:    Left Ventricle: Left ventricular cavity size is normal. Wall thickness is mildly increased. The left ventricular ejection fraction is 25% by 3D quantification. Systolic function is severely reduced. Global longitudinal strain is reduced at -7%. There is severe global hypokinesis with regional variation. Diastolic function is abnormal.    Right Ventricle: Systolic function is mildly reduced.    Left Atrium: The atrium is moderately dilated.    Aortic Valve: There is aortic valve sclerosis.    The following segments are akinetic: basal inferoseptal, mid anteroseptal, mid inferoseptal, mid inferior, mid inferolateral, apical anterior, apical septal, apical inferior, apical lateral and apex.    The following segments are hypokinetic: basal anterior, basal anteroseptal, basal inferior, basal inferolateral, basal anterolateral, mid anterior and mid anterolateral.     Severely reduced LVEF with only basal segments spared, and even then not circumferentially. Findings might represent stress-induced cardiomyopathy if multivessel CAD is ruled out.     Pharmacologic MPI 5/23/2025:    Stress ECG: No ST deviation is noted. The ECG was not diagnostic due to pharmacological (vasodilator) stress.    Stress Combined Conclusion: There is stone-infarct ischemia. Findings are consistent with infarction.    Perfusion: There is a left ventricular perfusion defect that is large in size present in the mid to apical anterior, lateral, septal and apex location(s).    Stress Function: Left ventricular function post-stress is abnormal. Global function is mildly reduced. There were multiple regional abnormalities during stress. Stress ejection fraction is 49%. There is a defect in the mid anterior, inferior, lateral and septal  location(s). The defect has severely reduced function. There is a defect in the apical anterior, inferior, lateral and septal location(s). The defect is akinetic. There is a defect in the apical location(s). The defect is akinetic.     Large infarct involving the mid-apical segments circumferentially and apex, likely due to multivessel coronary disease given the extent of affected territory. There is some stone-infarct ischemia.     PLAN:    GDMT limited:  Reduce Toprol XL to 12.5mg, instructed to take at night to reduce symptoms (cough). If still having symptoms advised she can stop taking.     Continue reduced Aldactone 12.5mg due to soft BP in the 80-90s. Despite low BP she has remained asymptomatic. She has remained euvolemic.    Continue Zocir 20mg QD. Routine FLP in 6 months.     As she is asymptomatic, defer further workup including cardiac catheterization for now.    Unfortunately, Cardiac MRI to determine viability cannot be performed due to non-MRI compatible device. Alternatively we can consider PET scan -- will discuss this with her primary cardiologist. Will reassess EF with limited echo in 90 days. If EF <35%, will refer to EP for ICD upgrade.     She notes being LifeVest is very uncomfortable and asked to turn this in to [facility]. We discussed need to continue LifeVest wear and was agreeable to continue this for now.     RTO in 3 months or sooner if needed     Interval History/ HPI:   Laura Fitzgerald is 85 yo female with past medical history noted above who presents for hospital follow-up visit.    Reported for sudden onset right flank pain on 520 1 in the afternoon after lunch which got progressively worse prompting her to seek evaluation in the ED.  Upon arrival to the ED patient had complained of then epigastric chest discomfort radiating to her bilateral chest associated with nausea, vomiting and flank pain.  After receiving pain medication her discomfort completely resolved.  She was found with  elevated troponins peaked at 480.  EKG showed V paced rhythm with underlying A-fib.  Inpatient echocardiogram showed reduced EF 25% with global hypokinesis with regional variation, normal RV size and mildly reduced RV systolic function, moderate LA dilation, trace MR, trace TR.  Chest x-ray without evidence of pulmonary edema or effusion.  She was seen by cardiology service and recommended for outpatient cardiac MRI to determine viability prior to proceeding with left heart catheterization.  Patient with known history of atrial fibrillation, followed with Dr. Escobar at [facility].  She has been on Xarelto 20 mg daily for CVA prophylaxis.  She notes allergy to beta-blockers in the past.  She was on atenolol prior to her admission which was switched to metoprolol succinate.  She notes of considerable persistent cough since her discharge.  She saw her PCP recently and was noted with low systolic blood pressures in the 80s to 90s as low as 70.  She denies any syncope or presyncopal symptoms.  Her Aldactone was reduced to 12.5 mg daily.  She was scheduled for cardiac MRI however was canceled due to her pacer not being MRI compatible.  She feels overall well CV wise without any symptoms including with exertion.     Vitals:  116/72  77  141lbs    Past Medical History[1]  Social History     Socioeconomic History    Marital status: /Civil Union     Spouse name: Not on file    Number of children: Not on file    Years of education: Not on file    Highest education level: Not on file   Occupational History    Not on file   Tobacco Use    Smoking status: Never    Smokeless tobacco: Never   Vaping Use    Vaping status: Never Used   Substance and Sexual Activity    Alcohol use: Yes    Drug use: Never    Sexual activity: Not on file   Other Topics Concern    Not on file   Social History Narrative    Not on file     Social Drivers of Health     Financial Resource Strain: Not on file   Food Insecurity: Patient  Declined (5/21/2025)    Nursing - Inadequate Food Risk Classification     Worried About Running Out of Food in the Last Year: Not on file     Ran Out of Food in the Last Year: Not on file     Ran Out of Food in the Last Year: Patient declined   Transportation Needs: Patient Declined (5/21/2025)    Nursing - Transportation Risk Classification     Lack of Transportation: Not on file     Lack of Transportation: Patient declined   Physical Activity: Not on file   Stress: Not on file   Social Connections: Not on file   Intimate Partner Violence: Patient Declined (5/21/2025)    Nursing IPS     Feels Physically and Emotionally Safe: Not on file     Physically Hurt by Someone: Not on file     Humiliated or Emotionally Abused by Someone: Not on file     Physically Hurt by Someone: Patient declined     Hurt or Threatened by Someone: Patient declined   Housing Stability: Patient Declined (5/21/2025)    Nursing: Inadequate Housing Risk Classification     Has Housing: Not on file     Worried About Losing Housing: Not on file     Unable to Get Utilities: Not on file     Unable to Pay for Housing in the Last Year: Patient declined     Has Housing: Patient declined      Family History[2]  Past Surgical History[3]  Current Medications[4]      Review of Systems:  Review of Systems   Constitutional:  Negative for appetite change, chills, diaphoresis, fatigue and fever.   Respiratory:  Negative for cough, chest tightness and shortness of breath.    Cardiovascular:  Negative for chest pain, palpitations and leg swelling.   Gastrointestinal:  Negative for diarrhea, nausea and vomiting.   Endocrine: Negative for cold intolerance and heat intolerance.   Genitourinary:  Negative for difficulty urinating, dysuria and enuresis.   Musculoskeletal:  Negative for arthralgias, back pain and gait problem.   Allergic/Immunologic: Negative for environmental allergies and food allergies.   Neurological:  Negative for dizziness, facial asymmetry and  headaches.   Hematological:  Negative for adenopathy. Does not bruise/bleed easily.   Psychiatric/Behavioral:  Negative for agitation, behavioral problems and confusion.          Physical Exam:  Physical Exam  Constitutional:       Appearance: She is well-developed.   HENT:      Right Ear: External ear normal.      Left Ear: External ear normal.     Eyes:      Pupils: Pupils are equal, round, and reactive to light.       Cardiovascular:      Rate and Rhythm: Normal rate. Rhythm irregular.      Heart sounds: Normal heart sounds. No murmur heard.     No friction rub. No gallop.   Pulmonary:      Effort: Pulmonary effort is normal.      Breath sounds: Normal breath sounds.   Abdominal:      Palpations: Abdomen is soft.     Musculoskeletal:         General: Normal range of motion.      Cervical back: Normal range of motion.     Skin:     General: Skin is warm and dry.     Neurological:      Mental Status: She is alert and oriented to person, place, and time.      Deep Tendon Reflexes: Reflexes are normal and symmetric.     Psychiatric:         Behavior: Behavior normal.         Thought Content: Thought content normal.         Judgment: Judgment normal.         This note was completed in part utilizing [employer] Direct Software.  Grammatical errors, random word insertions, spelling mistakes, and incomplete sentences can be an occasional consequence of this system secondary to software limitations, ambient noise, and hardware issues.  If you have any questions or concerns about the content, text, or information contained within the body of this dictation, please contact the provider for clarification.          [1]   Past Medical History:  Diagnosis Date    Atrial fibrillation (HCC)     Dr Escobar    Cancer (HCC)     colon, resolved after resection    Chronic anticoagulation     Colon cancer (HCC)     History of colon cancer s/p sigmoid resection 2011    Hyperlipidemia     Hypertension     pt states resolved     Thyroid disease    [2]   Family History  Problem Relation Name Age of Onset    Colon cancer Brother     [3]   Past Surgical History:  Procedure Laterality Date    ATRIAL CARDIAC PACEMAKER INSERTION  2006 and 2014    COLON SIGMOID RESECTION  2011   [4]   Current Outpatient Medications:     calcium carbonate (OS-BEL) 1250 (500 Ca) MG tablet, Take 1 tablet by mouth daily, Disp: , Rfl:     Coenzyme Q10 (Co Q 10) 10 MG CAPS, Take by mouth, Disp: , Rfl:     COMBIGAN 0.2-0.5 %, Administer 1 drop to both eyes every 12 (twelve) hours, Disp: , Rfl:     levothyroxine 50 mcg tablet, , Disp: , Rfl:     lisinopril (ZESTRIL) 2.5 mg tablet, Take 1 tablet (2.5 mg total) by mouth daily, Disp: 30 tablet, Rfl: 0    metoprolol succinate (TOPROL-XL) 25 mg 24 hr tablet, Take 1 tablet (25 mg total) by mouth daily, Disp: 30 tablet, Rfl: 0    Multiple Vitamins-Minerals (MULTIVITAMIN ADULT PO), Take by mouth, Disp: , Rfl:     Omega-3 Fatty Acids (Fish Oil Concentrate) 300 MG CAPS, Take by mouth, Disp: , Rfl:     Rhopressa 0.02 % SOLN, Administer 1 drop to both eyes daily at bedtime, Disp: , Rfl:     spironolactone (ALDACTONE) 25 mg tablet, Take 1 tablet (25 mg total) by mouth daily (Patient taking differently: Take 25 mg by mouth daily Pt reports taking 12.5 mg daily, changed dose on 6/3), Disp: 30 tablet, Rfl: 0    Xarelto 20 MG tablet, , Disp: , Rfl:     simvastatin (ZOCOR) 10 mg tablet, , Disp: , Rfl:     Turmeric 5-1000 MG CAPS, Turmeric, Disp: , Rfl:

## 2025-07-07 ENCOUNTER — HOSPITAL ENCOUNTER (OUTPATIENT)
Dept: NON INVASIVE DIAGNOSTICS | Age: 85
Discharge: HOME | End: 2025-07-07
Attending: PHYSICIAN ASSISTANT
Payer: MEDICARE

## 2025-07-07 VITALS
DIASTOLIC BLOOD PRESSURE: 72 MMHG | HEART RATE: 74 BPM | BODY MASS INDEX: 24.98 KG/M2 | HEIGHT: 63 IN | WEIGHT: 141 LBS | SYSTOLIC BLOOD PRESSURE: 116 MMHG

## 2025-07-07 DIAGNOSIS — I42.0 DILATED CARDIOMYOPATHY (HCC): ICD-10-CM

## 2025-07-07 PROCEDURE — 93321 DOPPLER ECHO F-UP/LMTD STD: CPT

## 2025-07-07 PROCEDURE — 93321 DOPPLER ECHO F-UP/LMTD STD: CPT | Performed by: INTERNAL MEDICINE

## 2025-07-07 PROCEDURE — 93308 TTE F-UP OR LMTD: CPT

## 2025-07-07 PROCEDURE — 93308 TTE F-UP OR LMTD: CPT | Performed by: INTERNAL MEDICINE

## 2025-07-07 PROCEDURE — 93325 DOPPLER ECHO COLOR FLOW MAPG: CPT

## 2025-07-07 PROCEDURE — 93325 DOPPLER ECHO COLOR FLOW MAPG: CPT | Performed by: INTERNAL MEDICINE

## 2025-07-10 LAB
AORTIC ROOT: 3.2 CM
ASCENDING AORTA: 3 CM
BSA FOR ECHO PROCEDURE: 1.67 M2
FRACTIONAL SHORTENING: 21 (ref 28–44)
INTERVENTRICULAR SEPTUM IN DIASTOLE (PARASTERNAL SHORT AXIS VIEW): 0.8 CM
INTERVENTRICULAR SEPTUM: 0.8 CM (ref 0.6–1.1)
LAAS-AP2: 20.5 CM2
LAAS-AP4: 24.8 CM2
LEFT ATRIUM SIZE: 3.5 CM
LEFT ATRIUM VOLUME (MOD BIPLANE): 72 ML
LEFT ATRIUM VOLUME INDEX (MOD BIPLANE): 43.1 ML/M2
LEFT INTERNAL DIMENSION IN SYSTOLE: 3 CM (ref 2.1–4)
LEFT VENTRICLE DIASTOLIC VOLUME (MOD BIPLANE): 102 ML
LEFT VENTRICLE DIASTOLIC VOLUME INDEX (MOD BIPLANE): 61.1 ML/M2
LEFT VENTRICLE SYSTOLIC VOLUME (MOD BIPLANE): 58 ML
LEFT VENTRICLE SYSTOLIC VOLUME INDEX (MOD BIPLANE): 34.7 ML/M2
LEFT VENTRICULAR INTERNAL DIMENSION IN DIASTOLE: 3.8 CM (ref 3.5–6)
LEFT VENTRICULAR POSTERIOR WALL IN END DIASTOLE: 0.9 CM
LEFT VENTRICULAR STROKE VOLUME: 27 ML
LV EF BIPLANE MOD: 42 %
LV EF US.2D.A4C+ESTIMATED: 39 %
LVSV (TEICH): 27 ML
RIGHT ATRIAL 2D VOLUME: 71 ML
RIGHT ATRIUM AREA SYSTOLE A4C: 22.4 CM2
RIGHT VENTRICLE ID DIMENSION: 4.2 CM
SL CV LEFT ATRIUM LENGTH A2C: 5.6 CM
SL CV LV EF: 40
SL CV PED ECHO LEFT VENTRICLE DIASTOLIC VOLUME (MOD BIPLANE) 2D: 63 ML
SL CV PED ECHO LEFT VENTRICLE SYSTOLIC VOLUME (MOD BIPLANE) 2D: 36 ML
TR MAX PG: 25 MMHG
TR PEAK VELOCITY: 2.5 M/S
TRICUSPID ANNULAR PLANE SYSTOLIC EXCURSION: 1.1 CM
TRICUSPID VALVE PEAK REGURGITATION VELOCITY: 2.51 M/S

## 2025-07-11 ENCOUNTER — IN-CLINIC DEVICE VISIT (OUTPATIENT)
Dept: CARDIOLOGY CLINIC | Facility: CLINIC | Age: 85
End: 2025-07-11
Payer: MEDICARE

## 2025-07-11 DIAGNOSIS — Z95.0 CARDIAC PACEMAKER IN SITU: Primary | ICD-10-CM

## 2025-07-11 PROCEDURE — 93279 PRGRMG DEV EVAL PM/LDLS PM: CPT | Performed by: INTERNAL MEDICINE

## 2025-07-11 NOTE — PROGRESS NOTES
Results for orders placed or performed in visit on 07/11/25   Cardiac EP device report    Narrative    SJM DC/PM  NP/EST CARE-DEVICE INTERROGATED IN THE [facility] OFFICE: PRESENTING EGRAM AF @ 73 BPM. BATTERY VOLTAGE ADEQUATE-6 YRS.  72%. ALL AVAILABLE LEAD PARAMETERS WITHIN NORMAL LIMITS. 4 VHRS NOTED; AVAIL EGRAMS SHOWING RVR. PT ON METO SUCC & EF 40% (ECHO 2025). NO PROGRAMMING CHANGES MADE TO DEVICE PARAMETERS. NORMAL DEVICE FUNCTION. NC

## 2025-07-17 ENCOUNTER — RESULTS FOLLOW-UP (OUTPATIENT)
Dept: CARDIOLOGY CLINIC | Facility: CLINIC | Age: 85
End: 2025-07-17

## 2025-07-17 NOTE — TELEPHONE ENCOUNTER
----- Message from Gerry Schafer PA-C sent at 7/15/2025  1:57 PM EDT -----  Please call Laura and let her know that her heart pump function has recovered some. Advised her she can now take off and turn in her [employer] LifeVest. We will discuss her results in more detail at her   next visit.  ----- Message -----  From: Fernando Lazaro MD  Sent: 7/10/2025  12:03 PM EDT  To: Gerry Schafer PA-C